# Patient Record
Sex: FEMALE | Race: WHITE | HISPANIC OR LATINO | Employment: FULL TIME | URBAN - METROPOLITAN AREA
[De-identification: names, ages, dates, MRNs, and addresses within clinical notes are randomized per-mention and may not be internally consistent; named-entity substitution may affect disease eponyms.]

---

## 2019-03-29 ENCOUNTER — TRANSCRIBE ORDERS (OUTPATIENT)
Dept: URGENT CARE | Facility: MEDICAL CENTER | Age: 49
End: 2019-03-29

## 2019-03-29 ENCOUNTER — APPOINTMENT (OUTPATIENT)
Dept: RADIOLOGY | Facility: MEDICAL CENTER | Age: 49
End: 2019-03-29
Attending: PHYSICIAN ASSISTANT

## 2019-03-29 ENCOUNTER — APPOINTMENT (OUTPATIENT)
Dept: URGENT CARE | Facility: MEDICAL CENTER | Age: 49
End: 2019-03-29

## 2019-03-29 DIAGNOSIS — Z92.89 HISTORY OF POSITIVE PPD: Primary | ICD-10-CM

## 2019-03-29 DIAGNOSIS — Z92.89 HISTORY OF POSITIVE PPD: ICD-10-CM

## 2019-03-29 PROCEDURE — 71045 X-RAY EXAM CHEST 1 VIEW: CPT

## 2020-01-06 ENCOUNTER — APPOINTMENT (OUTPATIENT)
Dept: URGENT CARE | Age: 50
End: 2020-01-06

## 2020-01-06 ENCOUNTER — APPOINTMENT (OUTPATIENT)
Dept: LAB | Age: 50
End: 2020-01-06

## 2020-01-06 DIAGNOSIS — Z02.1 PRE-EMPLOYMENT EXAMINATION: ICD-10-CM

## 2020-01-06 DIAGNOSIS — Z02.1 PRE-EMPLOYMENT EXAMINATION: Primary | ICD-10-CM

## 2020-01-06 LAB
HBV SURFACE AB SER-ACNC: <3.1 MIU/ML
RUBV IGG SERPL IA-ACNC: 31.5 IU/ML

## 2020-01-06 PROCEDURE — 86765 RUBEOLA ANTIBODY: CPT

## 2020-01-06 PROCEDURE — 86706 HEP B SURFACE ANTIBODY: CPT

## 2020-01-06 PROCEDURE — 86480 TB TEST CELL IMMUN MEASURE: CPT

## 2020-01-06 PROCEDURE — 36415 COLL VENOUS BLD VENIPUNCTURE: CPT

## 2020-01-06 PROCEDURE — 86787 VARICELLA-ZOSTER ANTIBODY: CPT

## 2020-01-06 PROCEDURE — 86762 RUBELLA ANTIBODY: CPT

## 2020-01-06 PROCEDURE — 86735 MUMPS ANTIBODY: CPT

## 2020-01-07 LAB
MEV IGG SER QL: NORMAL
MUV IGG SER QL: NORMAL
VZV IGG SER IA-ACNC: NORMAL

## 2020-01-08 LAB
GAMMA INTERFERON BACKGROUND BLD IA-ACNC: 0.35 IU/ML
M TB IFN-G BLD-IMP: NEGATIVE
M TB IFN-G CD4+ BCKGRND COR BLD-ACNC: -0.13 IU/ML
M TB IFN-G CD4+ BCKGRND COR BLD-ACNC: 0.01 IU/ML
MITOGEN IGNF BCKGRD COR BLD-ACNC: >10 IU/ML

## 2020-03-12 ENCOUNTER — OFFICE VISIT (OUTPATIENT)
Dept: FAMILY MEDICINE CLINIC | Facility: CLINIC | Age: 50
End: 2020-03-12
Payer: COMMERCIAL

## 2020-03-12 VITALS
TEMPERATURE: 98.3 F | SYSTOLIC BLOOD PRESSURE: 110 MMHG | OXYGEN SATURATION: 98 % | BODY MASS INDEX: 29.44 KG/M2 | WEIGHT: 160 LBS | DIASTOLIC BLOOD PRESSURE: 70 MMHG | HEIGHT: 62 IN | HEART RATE: 67 BPM

## 2020-03-12 DIAGNOSIS — Z13.220 ENCOUNTER FOR LIPID SCREENING FOR CARDIOVASCULAR DISEASE: ICD-10-CM

## 2020-03-12 DIAGNOSIS — Z23 NEED FOR ZOSTER VACCINATION: ICD-10-CM

## 2020-03-12 DIAGNOSIS — Z12.39 SCREENING FOR BREAST CANCER: ICD-10-CM

## 2020-03-12 DIAGNOSIS — Z13.1 ENCOUNTER FOR SCREENING FOR DIABETES MELLITUS: ICD-10-CM

## 2020-03-12 DIAGNOSIS — Z12.11 SCREEN FOR COLON CANCER: ICD-10-CM

## 2020-03-12 DIAGNOSIS — Z13.0 SCREENING FOR IRON DEFICIENCY ANEMIA: ICD-10-CM

## 2020-03-12 DIAGNOSIS — Z13.6 ENCOUNTER FOR LIPID SCREENING FOR CARDIOVASCULAR DISEASE: ICD-10-CM

## 2020-03-12 DIAGNOSIS — Z13.29 SCREENING FOR THYROID DISORDER: ICD-10-CM

## 2020-03-12 DIAGNOSIS — Z00.01 ENCOUNTER FOR WELL ADULT EXAM WITH ABNORMAL FINDINGS: Primary | ICD-10-CM

## 2020-03-12 PROCEDURE — 99386 PREV VISIT NEW AGE 40-64: CPT | Performed by: FAMILY MEDICINE

## 2020-03-12 PROCEDURE — 90471 IMMUNIZATION ADMIN: CPT

## 2020-03-12 PROCEDURE — 90750 HZV VACC RECOMBINANT IM: CPT

## 2020-03-12 NOTE — PROGRESS NOTES
FAMILY PRACTICE HEALTH MAINTENANCE OFFICE VISIT  [facility]  [facility] [place]    NAME: Janis Hercules  AGE: 44 y o  SEX: female  : 1970     DATE: 3/12/2020    Assessment and Plan     1  Encounter for well adult exam with abnormal findings  Assessment & Plan:  Advice and education were given regarding nutrition, aerobic exercises, weight bearing exercises, cardiovascular risk reduction, fall risk reduction, and age appropriate supplements  The patient was counseled regarding instructions for management, risk factor reductions, prognosis, risks and benefits of treatment options, patient and family education, and importance of compliance with treatment  Discussed the shingle vaccine with the patient she agree will give her 1st dose today possible side effect discussed with the patient      2  BMI 29 0-29 9,adult  Assessment & Plan:  The BMI is above average  BMI counseling and education was provided to the patient  Nutrition recommendations include reducing portion sizes, decreasing overall calorie intake, 3-5 servings of fruits/vegetables daily, reducing fast food intake, consuming healthier snacks, decreasing soda and/or juice intake, moderation in carbohydrate intake and reducing intake of saturated fat and trans fat  Exercise recommendations include moderate aerobic physical activity for 150 minutes/week, exercising 3-5 times per week and joining a gym  3  Screening for breast cancer  -     Mammo screening bilateral w 3d & cad; Future    4  Screen for colon cancer  -     Cologuard; Future    5  Need for zoster vaccination  -     Zoster Vaccine Recombinant IM    6  Encounter for lipid screening for cardiovascular disease  -     CBC and differential; Future  -     Basic metabolic panel; Future  -     Lipid Panel with Direct LDL reflex; Future  -     TSH, 3rd generation with Free T4 reflex; Future    7   Encounter for screening for diabetes mellitus  -     CBC and differential; Future  -     Basic metabolic panel; Future  -     Lipid Panel with Direct LDL reflex; Future  -     TSH, 3rd generation with Free T4 reflex; Future    8  Screening for iron deficiency anemia  -     CBC and differential; Future  -     Basic metabolic panel; Future  -     Lipid Panel with Direct LDL reflex; Future  -     TSH, 3rd generation with Free T4 reflex; Future    9  Screening for thyroid disorder  -     CBC and differential; Future  -     Basic metabolic panel; Future  -     Lipid Panel with Direct LDL reflex; Future  -     TSH, 3rd generation with Free T4 reflex; Future      · Patient Counseling:   · Nutrition: Stressed importance of a well balanced diet, moderation of sodium/saturated fat, caloric balance and sufficient intake of fiber  · Exercise: Stressed the importance of regular exercise with a goal of 150 minutes per week  · Dental Health: Discussed daily flossing and brushing and regular dental visits     · Immunizations reviewed: Risks and Benefits discussed  · Discussed benefits of:  Colon Cancer Screening, Mammogram , Cervical Cancer screening and Screening labs   BMI Counseling: Body mass index is 29 74 kg/m²  Discussed with patient's BMI with her          Chief Complaint     Chief Complaint   Patient presents with    Physical Exam       History of Present Illness     Patient new in my office here to establish care patient deny any chest pain short of breath no palpitation no cough no wheezing no hematosis no headache no blurred vision no weakness or lateralized of the symptom no abdomen pain nausea vomiting or diarrhea no renal problem no rash no fever no change in the weight and no change in the mood she deny smoking she does do exercise x3/w in no special diet a she did not have any screen for colonoscopy previously and she did not do mammogram yet      Well Adult Physical   Patient here for a comprehensive physical exam       Diet and Physical Activity  Diet: Regular  Exercise: frequently      Depression Screen  PHQ-9 Depression Screening    PHQ-9:    Frequency of the following problems over the past two weeks:       Little interest or pleasure in doing things:  0 - not at all  Feeling down, depressed, or hopeless:  0 - not at all  PHQ-2 Score:  0          General Health  Hearing: Normal:  bilateral  Vision: wears glasses  Dental: regular dental visits    Reproductive Health  Post-menopausal       The following portions of the patient's history were reviewed and updated as appropriate: allergies, current medications, past family history, past medical history, past social history, past surgical history and problem list     Review of Systems     Review of Systems   Constitutional: Negative for fatigue and fever  HENT: Negative for ear pain, sinus pressure, sinus pain and sore throat  Eyes: Negative for pain and redness  Respiratory: Negative for cough, chest tightness and shortness of breath  Cardiovascular: Negative for chest pain, palpitations and leg swelling  Gastrointestinal: Negative for abdominal pain, blood in stool, constipation, diarrhea and nausea  Endocrine: Negative for heat intolerance and polydipsia  Genitourinary: Negative for flank pain, frequency and hematuria  Musculoskeletal: Negative for back pain and joint swelling  Skin: Negative for rash  Neurological: Negative for dizziness, numbness and headaches  Hematological: Does not bruise/bleed easily  Psychiatric/Behavioral: Negative for agitation and behavioral problems         Past Medical History     Past Medical History:   Diagnosis Date    Anxiety states 2012       Past Surgical History     Past Surgical History:   Procedure Laterality Date     SECTION         Social History     Social History     Socioeconomic History    Marital status: Single     Spouse name: None    Number of children: None    Years of education: None    Highest education level: None   Occupational History    None   Social Needs    Financial resource strain: Not hard at all   [address] insecurity:     Worry: Never true     Inability: Never true   [employer] needs:     Medical: No     Non-medical: No   Tobacco Use    Smoking status: Never Smoker    Smokeless tobacco: Never Used   Substance and Sexual Activity    Alcohol use: Yes    Drug use: Never    Sexual activity: None   Lifestyle    Physical activity:     Days per week: 4 days     Minutes per session: 120 min    Stress: Only a little   Relationships    Social connections:     Talks on phone: More than three times a week     Gets together: More than three times a week     Attends Caodaism service: Never     Active member of club or organization: No     Attends meetings of clubs or organizations: Never     Relationship status: Never     Intimate partner violence:     Fear of current or ex partner: No     Emotionally abused: No     Physically abused: No     Forced sexual activity: No   Other Topics Concern    None   Social History Narrative    None       Family History     Family History   Problem Relation Age of Onset    Osteoporosis Mother     Leukemia Brother     Diabetes Brother        Current Medications     No current outpatient medications on file  Allergies     No Known Allergies    Objective     /70   Pulse 67   Temp 98 3 °F (36 8 °C) (Tympanic)   Ht 5' 1 5" (1 562 m)   Wt 72 6 kg (160 lb)   SpO2 98%   Breastfeeding No   BMI 29 74 kg/m²      Physical Exam   Constitutional: She is oriented to person, place, and time  She appears well-developed and well-nourished  HENT:   Head: Normocephalic  Right Ear: External ear normal    Left Ear: External ear normal    Eyes: Conjunctivae and EOM are normal  Right eye exhibits no discharge  Left eye exhibits no discharge  Neck: No JVD present  Cardiovascular: Normal rate, regular rhythm and normal heart sounds  Exam reveals no gallop  No murmur heard  Pulmonary/Chest: Effort normal  No respiratory distress  She has no wheezes  She has no rales  She exhibits no tenderness  Abdominal: She exhibits no mass  There is no tenderness  There is no rebound  Musculoskeletal: She exhibits no edema or tenderness  Neurological: She is alert and oriented to person, place, and time  Skin: No rash noted  No erythema  Psychiatric: She has a normal mood and affect  Her behavior is normal            Lety Keene MD  [address] [place] [address]  BMI Counseling: Body mass index is 29 74 kg/m²  The BMI is above normal  Nutrition recommendations include reducing portion sizes, decreasing overall calorie intake, 3-5 servings of fruits/vegetables daily and reducing fast food intake  Exercise recommendations include moderate aerobic physical activity for 150 minutes/week

## 2020-03-12 NOTE — PATIENT INSTRUCTIONS

## 2020-03-12 NOTE — ASSESSMENT & PLAN NOTE
Advice and education were given regarding nutrition, aerobic exercises, weight bearing exercises, cardiovascular risk reduction, fall risk reduction, and age appropriate supplements  The patient was counseled regarding instructions for management, risk factor reductions, prognosis, risks and benefits of treatment options, patient and family education, and importance of compliance with treatment       Discussed the shingle vaccine with the patient she agree will give her 1st dose today possible side effect discussed with the patient

## 2020-03-16 ENCOUNTER — TRANSCRIBE ORDERS (OUTPATIENT)
Dept: ADMINISTRATIVE | Facility: HOSPITAL | Age: 50
End: 2020-03-16

## 2020-03-16 ENCOUNTER — APPOINTMENT (OUTPATIENT)
Dept: LAB | Facility: HOSPITAL | Age: 50
End: 2020-03-16
Payer: COMMERCIAL

## 2020-03-16 ENCOUNTER — APPOINTMENT (OUTPATIENT)
Dept: LAB | Facility: HOSPITAL | Age: 50
End: 2020-03-16

## 2020-03-16 DIAGNOSIS — Z13.6 ENCOUNTER FOR LIPID SCREENING FOR CARDIOVASCULAR DISEASE: ICD-10-CM

## 2020-03-16 DIAGNOSIS — Z13.220 ENCOUNTER FOR LIPID SCREENING FOR CARDIOVASCULAR DISEASE: ICD-10-CM

## 2020-03-16 DIAGNOSIS — Z13.1 ENCOUNTER FOR SCREENING FOR DIABETES MELLITUS: ICD-10-CM

## 2020-03-16 DIAGNOSIS — Z00.8 HEALTH EXAMINATION IN POPULATION SURVEY: ICD-10-CM

## 2020-03-16 DIAGNOSIS — Z13.0 SCREENING FOR IRON DEFICIENCY ANEMIA: ICD-10-CM

## 2020-03-16 DIAGNOSIS — Z13.29 SCREENING FOR THYROID DISORDER: ICD-10-CM

## 2020-03-16 DIAGNOSIS — Z00.8 HEALTH EXAMINATION IN POPULATION SURVEY: Primary | ICD-10-CM

## 2020-03-16 LAB
ANION GAP SERPL CALCULATED.3IONS-SCNC: 7 MMOL/L (ref 4–13)
BASOPHILS # BLD AUTO: 0.04 THOUSANDS/ΜL (ref 0–0.1)
BASOPHILS NFR BLD AUTO: 1 % (ref 0–1)
BUN SERPL-MCNC: 24 MG/DL (ref 5–25)
CALCIUM SERPL-MCNC: 9.4 MG/DL (ref 8.3–10.1)
CHLORIDE SERPL-SCNC: 102 MMOL/L (ref 100–108)
CHOLEST SERPL-MCNC: 155 MG/DL (ref 50–200)
CO2 SERPL-SCNC: 29 MMOL/L (ref 21–32)
CREAT SERPL-MCNC: 0.98 MG/DL (ref 0.6–1.3)
EOSINOPHIL # BLD AUTO: 0.1 THOUSAND/ΜL (ref 0–0.61)
EOSINOPHIL NFR BLD AUTO: 1 % (ref 0–6)
ERYTHROCYTE [DISTWIDTH] IN BLOOD BY AUTOMATED COUNT: 13.1 % (ref 11.6–15.1)
EST. AVERAGE GLUCOSE BLD GHB EST-MCNC: 114 MG/DL
GFR SERPL CREATININE-BSD FRML MDRD: 67 ML/MIN/1.73SQ M
GLUCOSE P FAST SERPL-MCNC: 98 MG/DL (ref 65–99)
HBA1C MFR BLD: 5.6 %
HCT VFR BLD AUTO: 38.3 % (ref 34.8–46.1)
HDLC SERPL-MCNC: 59 MG/DL
HGB BLD-MCNC: 12.2 G/DL (ref 11.5–15.4)
IMM GRANULOCYTES # BLD AUTO: 0.02 THOUSAND/UL (ref 0–0.2)
IMM GRANULOCYTES NFR BLD AUTO: 0 % (ref 0–2)
LDLC SERPL CALC-MCNC: 88 MG/DL (ref 0–100)
LYMPHOCYTES # BLD AUTO: 2.02 THOUSANDS/ΜL (ref 0.6–4.47)
LYMPHOCYTES NFR BLD AUTO: 26 % (ref 14–44)
MCH RBC QN AUTO: 29 PG (ref 26.8–34.3)
MCHC RBC AUTO-ENTMCNC: 31.9 G/DL (ref 31.4–37.4)
MCV RBC AUTO: 91 FL (ref 82–98)
MONOCYTES # BLD AUTO: 0.67 THOUSAND/ΜL (ref 0.17–1.22)
MONOCYTES NFR BLD AUTO: 9 % (ref 4–12)
NEUTROPHILS # BLD AUTO: 4.95 THOUSANDS/ΜL (ref 1.85–7.62)
NEUTS SEG NFR BLD AUTO: 63 % (ref 43–75)
NRBC BLD AUTO-RTO: 0 /100 WBCS
PLATELET # BLD AUTO: 204 THOUSANDS/UL (ref 149–390)
PMV BLD AUTO: 11.3 FL (ref 8.9–12.7)
POTASSIUM SERPL-SCNC: 3.8 MMOL/L (ref 3.5–5.3)
RBC # BLD AUTO: 4.21 MILLION/UL (ref 3.81–5.12)
SODIUM SERPL-SCNC: 138 MMOL/L (ref 136–145)
TRIGL SERPL-MCNC: 41 MG/DL
TSH SERPL DL<=0.05 MIU/L-ACNC: 2.01 UIU/ML (ref 0.36–3.74)
WBC # BLD AUTO: 7.8 THOUSAND/UL (ref 4.31–10.16)

## 2020-03-16 PROCEDURE — 36415 COLL VENOUS BLD VENIPUNCTURE: CPT

## 2020-03-16 PROCEDURE — 84443 ASSAY THYROID STIM HORMONE: CPT

## 2020-03-16 PROCEDURE — 80061 LIPID PANEL: CPT

## 2020-03-16 PROCEDURE — 83036 HEMOGLOBIN GLYCOSYLATED A1C: CPT

## 2020-03-16 PROCEDURE — 85025 COMPLETE CBC W/AUTO DIFF WBC: CPT

## 2020-03-16 PROCEDURE — 80048 BASIC METABOLIC PNL TOTAL CA: CPT

## 2020-04-08 ENCOUNTER — TRANSCRIBE ORDERS (OUTPATIENT)
Dept: ADMINISTRATIVE | Facility: HOSPITAL | Age: 50
End: 2020-04-08

## 2020-04-08 ENCOUNTER — APPOINTMENT (OUTPATIENT)
Dept: LAB | Facility: HOSPITAL | Age: 50
End: 2020-04-08

## 2020-04-08 DIAGNOSIS — Z01.84 IMMUNITY STATUS TESTING: Primary | ICD-10-CM

## 2020-04-08 DIAGNOSIS — Z01.84 IMMUNITY STATUS TESTING: ICD-10-CM

## 2020-04-08 LAB — HBV SURFACE AB SER-ACNC: 67.86 MIU/ML

## 2020-04-08 PROCEDURE — 36415 COLL VENOUS BLD VENIPUNCTURE: CPT

## 2020-04-08 PROCEDURE — 86706 HEP B SURFACE ANTIBODY: CPT

## 2020-05-28 ENCOUNTER — TELEMEDICINE (OUTPATIENT)
Dept: FAMILY MEDICINE CLINIC | Facility: CLINIC | Age: 50
End: 2020-05-28
Payer: COMMERCIAL

## 2020-05-28 VITALS — BODY MASS INDEX: 29.74 KG/M2 | HEART RATE: 80 BPM | WEIGHT: 160 LBS

## 2020-05-28 DIAGNOSIS — R10.32 LLQ PAIN: Primary | ICD-10-CM

## 2020-05-28 PROCEDURE — 1036F TOBACCO NON-USER: CPT | Performed by: FAMILY MEDICINE

## 2020-05-28 PROCEDURE — 99214 OFFICE O/P EST MOD 30 MIN: CPT | Performed by: FAMILY MEDICINE

## 2020-06-02 ENCOUNTER — TELEPHONE (OUTPATIENT)
Dept: ADMINISTRATIVE | Facility: OTHER | Age: 50
End: 2020-06-02

## 2020-06-04 ENCOUNTER — OFFICE VISIT (OUTPATIENT)
Dept: FAMILY MEDICINE CLINIC | Facility: CLINIC | Age: 50
End: 2020-06-04
Payer: COMMERCIAL

## 2020-06-04 VITALS
OXYGEN SATURATION: 97 % | TEMPERATURE: 98.9 F | WEIGHT: 160 LBS | HEIGHT: 62 IN | DIASTOLIC BLOOD PRESSURE: 70 MMHG | HEART RATE: 71 BPM | BODY MASS INDEX: 29.44 KG/M2 | SYSTOLIC BLOOD PRESSURE: 100 MMHG

## 2020-06-04 DIAGNOSIS — R21 RASH: ICD-10-CM

## 2020-06-04 DIAGNOSIS — G89.29 CHRONIC LEFT-SIDED LOW BACK PAIN WITHOUT SCIATICA: ICD-10-CM

## 2020-06-04 DIAGNOSIS — M54.50 CHRONIC LEFT-SIDED LOW BACK PAIN WITHOUT SCIATICA: ICD-10-CM

## 2020-06-04 DIAGNOSIS — G44.89 OTHER HEADACHE SYNDROME: Primary | ICD-10-CM

## 2020-06-04 PROCEDURE — 3008F BODY MASS INDEX DOCD: CPT | Performed by: FAMILY MEDICINE

## 2020-06-04 PROCEDURE — 1036F TOBACCO NON-USER: CPT | Performed by: FAMILY MEDICINE

## 2020-06-04 PROCEDURE — 99215 OFFICE O/P EST HI 40 MIN: CPT | Performed by: FAMILY MEDICINE

## 2020-06-05 PROBLEM — G89.29 CHRONIC LEFT-SIDED LOW BACK PAIN WITHOUT SCIATICA: Status: ACTIVE | Noted: 2020-06-05

## 2020-06-05 PROBLEM — M54.50 CHRONIC LEFT-SIDED LOW BACK PAIN WITHOUT SCIATICA: Status: ACTIVE | Noted: 2020-06-05

## 2020-06-05 PROBLEM — G89.29 CHRONIC LEFT-SIDED LOW BACK PAIN WITHOUT SCIATICA: Status: ACTIVE | Noted: 2020-06-04

## 2020-06-05 PROBLEM — R21 RASH: Status: ACTIVE | Noted: 2020-06-05

## 2020-06-05 PROBLEM — R21 RASH: Status: ACTIVE | Noted: 2020-06-04

## 2020-06-05 PROBLEM — G44.89 OTHER HEADACHE SYNDROME: Status: ACTIVE | Noted: 2020-06-05

## 2020-06-05 PROBLEM — M54.50 CHRONIC LEFT-SIDED LOW BACK PAIN WITHOUT SCIATICA: Status: ACTIVE | Noted: 2020-06-04

## 2020-07-09 ENCOUNTER — TELEPHONE (OUTPATIENT)
Dept: FAMILY MEDICINE CLINIC | Facility: CLINIC | Age: 50
End: 2020-07-09

## 2020-12-01 ENCOUNTER — TELEMEDICINE (OUTPATIENT)
Dept: FAMILY MEDICINE CLINIC | Facility: CLINIC | Age: 50
End: 2020-12-01
Payer: COMMERCIAL

## 2020-12-01 DIAGNOSIS — R51.9 ACUTE NONINTRACTABLE HEADACHE, UNSPECIFIED HEADACHE TYPE: ICD-10-CM

## 2020-12-01 DIAGNOSIS — R52 GENERALIZED BODY ACHES: ICD-10-CM

## 2020-12-01 DIAGNOSIS — R05.9 COUGH: ICD-10-CM

## 2020-12-01 DIAGNOSIS — R52 GENERALIZED BODY ACHES: Primary | ICD-10-CM

## 2020-12-01 PROBLEM — Z20.822 EXPOSURE TO COVID-19 VIRUS: Status: ACTIVE | Noted: 2020-12-01

## 2020-12-01 PROCEDURE — U0003 INFECTIOUS AGENT DETECTION BY NUCLEIC ACID (DNA OR RNA); SEVERE ACUTE RESPIRATORY SYNDROME CORONAVIRUS 2 (SARS-COV-2) (CORONAVIRUS DISEASE [COVID-19]), AMPLIFIED PROBE TECHNIQUE, MAKING USE OF HIGH THROUGHPUT TECHNOLOGIES AS DESCRIBED BY CMS-2020-01-R: HCPCS | Performed by: NURSE PRACTITIONER

## 2020-12-01 PROCEDURE — 99213 OFFICE O/P EST LOW 20 MIN: CPT | Performed by: NURSE PRACTITIONER

## 2020-12-02 ENCOUNTER — TELEPHONE (OUTPATIENT)
Dept: FAMILY MEDICINE CLINIC | Facility: CLINIC | Age: 50
End: 2020-12-02

## 2020-12-02 LAB — SARS-COV-2 RNA SPEC QL NAA+PROBE: NOT DETECTED

## 2021-01-05 ENCOUNTER — IMMUNIZATIONS (OUTPATIENT)
Dept: FAMILY MEDICINE CLINIC | Facility: HOSPITAL | Age: 51
End: 2021-01-05

## 2021-01-05 DIAGNOSIS — Z23 ENCOUNTER FOR IMMUNIZATION: ICD-10-CM

## 2021-01-05 PROCEDURE — 91301 SARS-COV-2 / COVID-19 MRNA VACCINE (MODERNA) 100 MCG: CPT

## 2021-01-05 PROCEDURE — 0011A SARS-COV-2 / COVID-19 MRNA VACCINE (MODERNA) 100 MCG: CPT

## 2021-01-21 ENCOUNTER — TELEPHONE (OUTPATIENT)
Dept: FAMILY MEDICINE CLINIC | Facility: CLINIC | Age: 51
End: 2021-01-21

## 2021-02-15 ENCOUNTER — IMMUNIZATIONS (OUTPATIENT)
Dept: FAMILY MEDICINE CLINIC | Facility: HOSPITAL | Age: 51
End: 2021-02-15

## 2021-02-15 DIAGNOSIS — Z23 ENCOUNTER FOR IMMUNIZATION: Primary | ICD-10-CM

## 2021-02-15 PROCEDURE — 91301 SARS-COV-2 / COVID-19 MRNA VACCINE (MODERNA) 100 MCG: CPT

## 2021-02-15 PROCEDURE — 0012A SARS-COV-2 / COVID-19 MRNA VACCINE (MODERNA) 100 MCG: CPT

## 2021-03-08 ENCOUNTER — OFFICE VISIT (OUTPATIENT)
Dept: FAMILY MEDICINE CLINIC | Facility: CLINIC | Age: 51
End: 2021-03-08

## 2021-03-08 VITALS
SYSTOLIC BLOOD PRESSURE: 124 MMHG | HEIGHT: 62 IN | TEMPERATURE: 98.3 F | RESPIRATION RATE: 20 BRPM | DIASTOLIC BLOOD PRESSURE: 68 MMHG | BODY MASS INDEX: 28.16 KG/M2 | OXYGEN SATURATION: 98 % | HEART RATE: 70 BPM | WEIGHT: 153 LBS

## 2021-03-08 DIAGNOSIS — R51.9 ACUTE NONINTRACTABLE HEADACHE, UNSPECIFIED HEADACHE TYPE: ICD-10-CM

## 2021-03-08 DIAGNOSIS — H52.10 MYOPIA, UNSPECIFIED LATERALITY: ICD-10-CM

## 2021-03-08 DIAGNOSIS — H52.00 HYPERMETROPIA, UNSPECIFIED LATERALITY: ICD-10-CM

## 2021-03-08 DIAGNOSIS — Z00.00 ANNUAL PHYSICAL EXAM: Primary | ICD-10-CM

## 2021-03-08 DIAGNOSIS — G89.29 CHRONIC LEFT-SIDED LOW BACK PAIN WITHOUT SCIATICA: ICD-10-CM

## 2021-03-08 DIAGNOSIS — Z12.31 BREAST CANCER SCREENING BY MAMMOGRAM: ICD-10-CM

## 2021-03-08 DIAGNOSIS — Z00.00 HEALTHCARE MAINTENANCE: ICD-10-CM

## 2021-03-08 DIAGNOSIS — K59.00 CONSTIPATION, UNSPECIFIED CONSTIPATION TYPE: ICD-10-CM

## 2021-03-08 DIAGNOSIS — Z12.4 CERVICAL CANCER SCREENING: ICD-10-CM

## 2021-03-08 DIAGNOSIS — M54.50 CHRONIC LEFT-SIDED LOW BACK PAIN WITHOUT SCIATICA: ICD-10-CM

## 2021-03-08 DIAGNOSIS — F41.9 ANXIETY: ICD-10-CM

## 2021-03-08 PROCEDURE — 99386 PREV VISIT NEW AGE 40-64: CPT | Performed by: PHYSICIAN ASSISTANT

## 2021-03-08 RX ORDER — IBUPROFEN 800 MG/1
TABLET ORAL
COMMUNITY
Start: 2020-12-18

## 2021-03-08 NOTE — PROGRESS NOTES
Assessment/Plan:     Annual physical   - Patient is due for mammogram and Pap  Patient is agreeable for order for mammogram and Pap today  Constipation  - Discussed pathophysiology of constipation with patient  - Increase fluid intake, primarily water  Increase daily dietary fiber (dark leafy green vegetables, apples with skin, whole grain breads)  - May use OTC fiber source to supplement diet (Metamucil, Citrucel, FiberCon, Benefiber)  - Increase daily activity which can help stimulate bowel movements  - Avoid OTC laxatives and colon cleanses preparations which can worsen constipation  Chronic left-sided low back pain without sciatica  - Stable  Continue taking Tylenol or Motrin as needed for pain  - Advised to apply heating pad/ ice as needed to the affected area  Acute nonintractable headache  - Stable  Continue taking Tylenol as needed  - Advised patient to complete updated eye exam     Anxiety  - Patient has been dealing with anxiety for a long time but has always dealt with it on her own  - Patient is now requesting to see a therapist   Will place referral to behavior health therapy to further assist patient in scheduling outpatient mental health services  Return in about 1 year (around 3/8/2022) for Annual physical       Diagnoses and all orders for this visit:    Annual physical exam    Healthcare maintenance  -     CBC and differential; Future  -     Comprehensive metabolic panel; Future  -     Lipid panel; Future  -     TSH, 3rd generation with Free T4 reflex; Future  -     Vitamin D 25 hydroxy; Future  -     Ferritin; Future  -     Iron Saturation %; Future    Breast cancer screening by mammogram  -     Mammo screening bilateral w cad; Future    Cervical cancer screening  -     Ambulatory referral to Obstetrics / Gynecology; Future    Myopia, unspecified laterality  -     Ambulatory referral to Optometry;  Future    Hypermetropia, unspecified laterality  - Ambulatory referral to Optometry; Future    Anxiety  -     Ambulatory referral to behavioral health therapists; Future    Acute nonintractable headache, unspecified headache type    Chronic left-sided low back pain without sciatica    Constipation, unspecified constipation type    Other orders  -     ibuprofen (MOTRIN) 800 mg tablet        All of patients questions were answered  Patient understands and agrees with the above plan  Mayelin Long PA-C  03/08/21  [facility] [place]       Subjective:     Allyson Mast is a 46 y o  female who  has a past medical history of Anxiety states  She also has no past medical history of Allergic, Anemia, Arthritis, Asthma, Cancer ([address] ), Chronic kidney disease, Clotting disorder ([address] ), COPD (chronic obstructive pulmonary disease) ([address] ), Coronary artery disease, Dementia ([address] ), Depression, Diabetes mellitus ([address] ), Disease of thyroid gland, Diverticulitis of colon, Eating disorder, GERD (gastroesophageal reflux disease), Heart murmur, HL (hearing loss), Hypertension, Infectious viral hepatitis, Inflammatory bowel disease, Kidney stone, Memory loss, Myocardial infarction ([address] ), Obesity, Osteoporosis, Otitis media, Pneumonia, Scoliosis, Seizures ([address] ), Shingles, Stroke ([address] ), Substance abuse ([address] ), Urinary tract infection, or Visual impairment  who presented to the office today to establish care  - Patient is a 46 y o  female who presents today to   Establish care  Patient notes she is currently taking daily vitamin-D and vitamin C supplements  Patient denies any known allergies  Patient notes for a long time now she has been experiencing headaches  Patient notes they occur about 2 times a month and are usually located of her left temporal area  Patient admits to associated nausea, but denies any vomiting  Patient notes she will take either ibuprofen, Tylenol, or Excedrin every 6 hours and eventually the headache will go away    Patient notes previously she was taking a medication for her thyroid, but then she had repeat blood work completed and the medication was discontinued  Patient notes recently she has been feeling weaker and more fatigued than usual     - Patient notes she will also experience some pain of her left lower back area  Patient notes it comes and goes  Patient notes she will take Tylenol or Motrin as needed for the pain  She denies any fevers, numbness or tingling down the legs, saddle anesthesia, or loss of bladder or bowel function       - Patient notes for about 1 year now she has been experiencing constipation  Patient notes previously she would drink coffee and that would help her to use the bathroom  However, patient notes she will now drink coffee but that does not help  Patient notes she did purchase some laxatives over-the-counter but they do not agree with her stomach  Patient notes she does eat a lot of bread  Patient notes she does not eat a lot of vegetables  - Patient notes recently her mood has been all over the place  Patient notes she could be happy 1 minutes and then very frustrated in [place] the neck is  Patient notes she has went through a lot through life and does experience anxiety  Patient notes she has always been dealing with anxiety on her own, but now she would like to see a therapist   Patient notes there are times where she experiences palpitations and sweating and she has to try to calm down  Patient notes it has been happening more often  Patient denies any suicidal or homicidal ideations  Dental Regular Visits: Yes    Vision Problems: Wears glasses for both near and far vision  Patient notes she is due for a new pair of glasses  Last Vision Examination: About 2 years ago, due for updated eye exam        Life Style  Healthy Diet: Overall, patient notes she eats relatively well  Regular Exercise: Walks regularly but has not been able to go as often recently due to weather     Weight Concerns:  Patient notes she usually weighs around 140 lb and is currently weighing 153 lb  She would like to lose about 10 lb  Tobacco Use: No   Alcohol Use: Drinks wine about once a month  Drug Use: No    Reproductive Health  Contraception: Tubal ligation  LMP: About 5 years ago, postmenopausal  OB History: ; 2 C-sections      Breast Cancer Screening:  - Risks and benefits discussed  Patient notes she never had mammogram completed  Colorectal Cancer Screening:   - Risks and benefits discussed  Patient completed cologuard in 2020  Results were negative  Cervical Cancer Screening:  - Risks and benefits discussed  Last PAP in 2016  Results were normal        Current Outpatient Medications on File Prior to Visit   Medication Sig Dispense Refill    ibuprofen (MOTRIN) 800 mg tablet        No current facility-administered medications on file prior to visit  Past Medical History:   Diagnosis Date    Anxiety states 2012     Past Surgical History:   Procedure Laterality Date     SECTION      x2    TUBAL LIGATION       Social History     Socioeconomic History    Marital status: Single     Spouse name: None    Number of children: None    Years of education: None    Highest education level: None   Occupational History    None   Social Needs    Financial resource strain: Not hard at all   [address] insecurity     Worry: Never true     Inability: Never true    Transportation needs     Medical: No     Non-medical: No   Tobacco Use    Smoking status: Never Smoker    Smokeless tobacco: Never Used   Substance and Sexual Activity    Alcohol use: Yes     Comment: Once a month    Drug use: Never    Sexual activity: None   Lifestyle    Physical activity     Days per week: 4 days     Minutes per session: 30 min    Stress:  Only a little   Relationships    Social connections     Talks on phone: More than three times a week     Gets together: More than three times a week     Attends Mandaen service: Never     Active member of club or organization: No     Attends meetings of clubs or organizations: Never     Relationship status: Never     Intimate partner violence     Fear of current or ex partner: No     Emotionally abused: No     Physically abused: No     Forced sexual activity: No   Other Topics Concern    None   Social History Narrative    None     Family History   Problem Relation Age of Onset    Osteoporosis Mother     Leukemia Brother     Diabetes Brother          Review of Systems   Constitutional: Positive for fatigue  Negative for chills and fever  HENT: Negative for congestion and sore throat  Eyes: Negative for pain  Respiratory: Negative for cough and shortness of breath  Cardiovascular: Positive for palpitations (occasionally)  Negative for chest pain  Gastrointestinal: Positive for constipation  Negative for abdominal pain, diarrhea, nausea and vomiting  Genitourinary: Negative for difficulty urinating and dysuria  Musculoskeletal: Positive for back pain  Skin: Negative for rash  Neurological: Positive for headaches (2x/month)  Negative for dizziness  Psychiatric/Behavioral: Negative for self-injury and suicidal ideas  The patient is nervous/anxious  Objective:  /68 (BP Location: Right arm, Patient Position: Sitting, Cuff Size: Standard)   Pulse 70   Temp 98 3 °F (36 8 °C) (Temporal)   Resp 20   Ht 5' 1 5" (1 562 m)   Wt 69 4 kg (153 lb)   SpO2 98%   BMI 28 44 kg/m²     Physical Exam  Vitals signs and nursing note reviewed  Constitutional:       Appearance: She is well-developed  HENT:      Head: Normocephalic and atraumatic  Right Ear: Tympanic membrane and external ear normal       Left Ear: Tympanic membrane and external ear normal       Nose: Nose normal       Mouth/Throat:      Pharynx: Uvula midline  Eyes:      Extraocular Movements: Extraocular movements intact        Conjunctiva/sclera: Conjunctivae normal  Pupils: Pupils are equal, round, and reactive to light  Neck:      Musculoskeletal: Normal range of motion and neck supple  Cardiovascular:      Rate and Rhythm: Normal rate and regular rhythm  Heart sounds: Normal heart sounds  No murmur  Pulmonary:      Effort: Pulmonary effort is normal       Breath sounds: Normal breath sounds  No wheezing  Abdominal:      General: Bowel sounds are normal       Palpations: Abdomen is soft  Tenderness: There is no abdominal tenderness  Musculoskeletal: Normal range of motion  General: No swelling  Skin:     General: Skin is warm and dry  Neurological:      Mental Status: She is alert and oriented to person, place, and time     Psychiatric:         Mood and Affect: Mood normal          Speech: Speech normal          Behavior: Behavior normal

## 2021-03-08 NOTE — ASSESSMENT & PLAN NOTE
- Discussed pathophysiology of constipation with patient  - Increase fluid intake, primarily water  Increase daily dietary fiber (dark leafy green vegetables, apples with skin, whole grain breads)  - May use OTC fiber source to supplement diet (Metamucil, Citrucel, FiberCon, Benefiber)  - Increase daily activity which can help stimulate bowel movements  - Avoid OTC laxatives and colon cleanses preparations which can worsen constipation

## 2021-03-08 NOTE — PATIENT INSTRUCTIONS
High Fiber Diet   WHAT YOU NEED TO KNOW:   A high-fiber diet includes foods that have a high amount of fiber  Fiber is the part of fruits, vegetables, and grains that is not broken down by your body  Fiber keeps your bowel movements regular  Fiber can also help lower your cholesterol level, control blood sugar in people with diabetes, and relieve constipation  Fiber can also help you control your weight because it helps you feel full faster  Most adults should eat 25 to 35 grams of fiber each day  Talk to your dietitian or healthcare provider about the amount of fiber you need  DISCHARGE INSTRUCTIONS:   Good sources of fiber:       · Foods with at least 4 grams of fiber per serving:      ? ? to ½ cup of high-fiber cereal (check the nutrition label on the box)    ? ½ cup of blackberries or raspberries    ? 4 dried prunes    ? 1 cooked artichoke    ? ½ cup of cooked legumes, such as lentils, or red, kidney, and marr beans    · Foods with 1 to 3 grams of fiber per serving:      ? 1 slice of whole-wheat, pumpernickel, or rye bread    ? ½ cup of cooked brown rice    ? 4 whole-wheat crackers    ? 1 cup of oatmeal    ? ½ cup of cereal with 1 to 3 grams of fiber per serving (check the nutrition label on the box)    ? 1 small piece of fruit, such as an apple, banana, pear, kiwi, or orange    ? 3 dates    ? ½ cup of canned apricots, fruit cocktail, peaches, or pears    ? ½ cup of raw or cooked vegetables, such as carrots, cauliflower, cabbage, spinach, squash, or corn  Ways that you can increase fiber in your diet:   · Choose brown or wild rice instead of white rice  · Use whole wheat flour in recipes instead of white or all-purpose flour  · Add beans and peas to casseroles or soups  · Choose fresh fruit and vegetables with peels or skins on instead of juices  Other diet guidelines to follow:   · Add fiber to your diet slowly    You may have abdominal discomfort, bloating, and gas if you add fiber to your diet too quickly  · Drink plenty of liquids as you add fiber to your diet  You may have nausea or develop constipation if you do not drink enough water  Ask how much liquid to drink each day and which liquids are best for you  © Copyright [address] Information is for End User's use only and may not be sold, redistributed or otherwise used for commercial purposes  All illustrations and images included in CareNotes® are the copyrighted property of A D A M , Inc  or [facility] Catherine Rios   The above information is an  only  It is not intended as medical advice for individual conditions or treatments  Talk to your doctor, nurse or pharmacist before following any medical regimen to see if it is safe and effective for you  Constipation   WHAT YOU NEED TO KNOW:   Constipation is when you have hard, dry bowel movements, or you go longer than usual between bowel movements  DISCHARGE INSTRUCTIONS:   Call your doctor if:   · You have blood in your bowel movements  · You have a fever and abdominal pain with the constipation  · Your constipation gets worse  · You start to vomit  · You have questions or concerns about your condition or care  Medicines:   · Medicine  such as a laxative may help relax and loosen your intestines to help you have a bowel movement  Your provider may recommend you only use laxatives for a short time  Long-term use may make your bowels dependent on the medicine  · Take your medicine as directed  Contact your healthcare provider if you think your medicine is not helping or if you have side effects  Tell him of her if you are allergic to any medicine  Keep a list of the medicines, vitamins, and herbs you take  Include the amounts, and when and why you take them  Bring the list or the pill bottles to follow-up visits  Carry your medicine list with you in case of an emergency      Relieve constipation:   · A suppository  may be used to help soften your bowel movements  This may make them easier to pass  A suppository is guided into your rectum through your anus  · An enema  is liquid medicine used to clear bowel movement from your rectum  The medicine is put into your rectum through your anus  Prevent constipation:   · Drink liquids as directed  You may need to drink extra liquids to help soften and move your bowels  Ask how much liquid to drink each day and which liquids are best for you  · Eat high-fiber foods  This may help decrease constipation by adding bulk to your bowel movements  High-fiber foods include fruit, vegetables, whole-grain breads and cereals, and beans  Your healthcare provider or dietitian can help you create a high-fiber meal plan  Your provider may also recommend a fiber supplement if you cannot get enough fiber from food  · Exercise regularly  Regular physical activity can help stimulate your intestines  Walking is a good exercise to prevent or relieve constipation  Ask which exercises are best for you  · Schedule a time each day to have a bowel movement  This may help train your body to have regular bowel movements  Bend forward while you are on the toilet to help move the bowel movement out  Sit on the toilet for at least 10 minutes, even if you do not have a bowel movement  · Talk to your healthcare provider about your medicines  Certain medicines, such as opioids, can cause constipation  Your provider may be able to make medicine changes  For example, he or she may change the kind of medicine, or change when you take it  Follow up with your healthcare provider as directed:  Write down your questions so you remember to ask them during your visits  © Copyright [address] Information is for End User's use only and may not be sold, redistributed or otherwise used for commercial purposes   All illustrations and images included in CareNotes® are the copyrighted property of [employer]  or [address] [place] [address] Gabriel   The above information is an  only  It is not intended as medical advice for individual conditions or treatments  Talk to your doctor, nurse or pharmacist before following any medical regimen to see if it is safe and effective for you

## 2021-03-08 NOTE — ASSESSMENT & PLAN NOTE
- Stable  Continue taking Tylenol or Motrin as needed for pain  - Advised to apply heating pad/ ice as needed to the affected area

## 2021-03-08 NOTE — ASSESSMENT & PLAN NOTE
- Patient has been dealing with anxiety for a long time but has always dealt with it on her own  - Patient is now requesting to see a therapist   Will place referral to behavior health therapy to further assist patient in scheduling outpatient mental health services

## 2021-03-09 ENCOUNTER — TELEPHONE (OUTPATIENT)
Dept: FAMILY MEDICINE CLINIC | Facility: CLINIC | Age: 51
End: 2021-03-09

## 2021-03-09 NOTE — TELEPHONE ENCOUNTER
Celeste      Please schedule with Dr Smith Plan on Thursday morning during [facility] block      Thank you

## 2021-04-28 ENCOUNTER — OFFICE VISIT (OUTPATIENT)
Dept: OBGYN CLINIC | Facility: CLINIC | Age: 51
End: 2021-04-28
Payer: COMMERCIAL

## 2021-04-28 VITALS
BODY MASS INDEX: 28.61 KG/M2 | HEIGHT: 62 IN | SYSTOLIC BLOOD PRESSURE: 114 MMHG | WEIGHT: 155.5 LBS | DIASTOLIC BLOOD PRESSURE: 78 MMHG

## 2021-04-28 DIAGNOSIS — Z01.419 WOMEN'S ANNUAL ROUTINE GYNECOLOGICAL EXAMINATION: Primary | ICD-10-CM

## 2021-04-28 DIAGNOSIS — N83.202 CYST OF LEFT OVARY: ICD-10-CM

## 2021-04-28 PROCEDURE — 99386 PREV VISIT NEW AGE 40-64: CPT | Performed by: STUDENT IN AN ORGANIZED HEALTH CARE EDUCATION/TRAINING PROGRAM

## 2021-04-28 PROCEDURE — 87624 HPV HI-RISK TYP POOLED RSLT: CPT | Performed by: STUDENT IN AN ORGANIZED HEALTH CARE EDUCATION/TRAINING PROGRAM

## 2021-04-28 PROCEDURE — G0145 SCR C/V CYTO,THINLAYER,RESCR: HCPCS | Performed by: STUDENT IN AN ORGANIZED HEALTH CARE EDUCATION/TRAINING PROGRAM

## 2021-04-28 NOTE — PROGRESS NOTES
New patient annual exam - Gynecology    Chief complaint: annual exam    Chief Complaint   Patient presents with    Gynecologic Exam     annual exam    last pap: 16-neg  last mammo: never  last colonoscopy: cologuard 20- neg  Assessment/Plan     46 y o  [id number] with normal annual gynecologic examination  Problem List Items Addressed This Visit        Endocrine    Cyst of left ovary     History of, no intervention  On exam today, left adnexal fullness/tenderness  Ultrasound ordered         Relevant Orders    US pelvis complete w transvaginal      Other Visit Diagnoses     Women's annual routine gynecological examination    -  Primary    Relevant Orders    Liquid-based pap, screening        Normal findings on routine gynecologic exam today  Reviewed annual screening mammogram and annual clinical breast exam       Discussed ASCCP guidelines and Pap smear with cotesting frequency, collected today  STD/STI testing offered, declined today - reports low risk  Encouraged daily calcium and vitamin D intake was well as weight bearing exercise daily for bone health  Follow up PRN or for annual exam   --------------------------------------------------------  History of Present Illness     Sapphire Sanchez is a 46 y o  female  No LMP recorded (lmp unknown)  Patient is postmenopausal  who presents for annual examination  Concerns today: some moodiness recently but no hot flashes or night sweats    New updates: none    Health maintenance  Last pap smear: 2016 NILM  Bone density scan: n/a  Last mammogram: Not on file has not yet had  Last colonoscopy: Not on file Cologard 2020 normal  HPV vaccination?: no    GYN History  Menarche age 13  No LMP recorded (lmp unknown)  Patient is postmenopausal    5 years ago last menstrual    Previously regular periods  No history abnormal Pap smears  No history of cryotherapy, LEEP, or cold knife cone of the cervix    Sexually active?  No, lack of partner  Current sexual partner(s): hypothetically men  History of STD: no  Interested in STD screening today? no  Concerns about sex: no    Occupation: [employer] works on [place] [address]    OB History    Para Term  AB Living   2 2 2     2   SAB TAB Ectopic Multiple Live Births           2      # Outcome Date GA Lbr Jamal/2nd Weight Sex Delivery Anes PTL Lv   2 Term     F CS-LTranv   RAGINI   1 Term     F CS-LTranv   RAGINI     # 1 - Date: None, Sex: Female, Weight: None, GA: None, Delivery: , Low Transverse, Apgar1: None, Apgar5: None, Living: Living, Birth Comments: None    # 2 - Date: None, Sex: Female, Weight: None, GA: None, Delivery: , Low Transverse, Apgar1: None, Apgar5: None, Living: Living, Birth Comments: None    Past Medical History:   Diagnosis Date    Anxiety states 2012     Past Surgical History:   Procedure Laterality Date     SECTION      x2    TUBAL LIGATION       Family History   Problem Relation Age of Onset    Osteoporosis Mother     Leukemia Brother     Diabetes Brother      Social History   Social History     Substance and Sexual Activity   Alcohol Use Not Currently    Comment: Once a month     Social History     Substance and Sexual Activity   Drug Use Never     Social History     Tobacco Use   Smoking Status Never Smoker   Smokeless Tobacco Never Used       Current Outpatient Medications:     Ascorbic Acid (VITAMIN C PO), Take by mouth, Disp: , Rfl:     VITAMIN D PO, Take by mouth, Disp: , Rfl:     ibuprofen (MOTRIN) 800 mg tablet, , Disp: , Rfl:   No Known Allergies    REVIEW OF SYSTEMS  CONSTITUTIONAL:  No weight loss, fever, chills, weakness  HEENT: No visual loss, blurred vision  SKIN: No rash or itching  CARDIOVASCULAR: No chest pain, chest pressure, or chest discomfort  RESPIRATORY: No shortness of breath, cough or sputum  GASTROINTESTINAL: No nausea, emesis, or diarrhea    NEUROLOGICAL: No headache, dizziness, syncope, paralysis  MUSCUOSKELETAL: No muscle, back pain, joint stiffness or bruising  INFECTIOUS: No fever, chills  PSYCHIATRIC: No disorder of thought or mood  HEMATOLOGIC: No easy bruising or bleeding  GYN: no postmenopausal bleeding  No involuntary urine loss  No pain with intercourse  No vaginal dryness    Objective   Vitals: Blood pressure 114/78, height 5' 2" (1 575 m), weight 70 5 kg (155 lb 8 oz), not currently breastfeeding  Body mass index is 28 44 kg/m²  General: NAD, AAOx3  Heart: RRR  Lungs: CTAB  Neck: supple, no thyromegaly or thyroid nodules appreciated  Breast: nipples everted bilaterally, no skin changes  No dimpling, redness, or erythema  No breast masses or axillary masses bilaterally  Fibrocystic breast changes  Abdomen: soft, non-distended, non tender to palpation  Well healed  section scars  Extremities: non-tender to palpation  Speculum exam: Normal appearing external genitalia, normal hair distribution  Urethra well-suspended, no clitoromegaly noted  Vagina pink moist  Minimal rugation, scant discharge noted  Cervix without lesion, parous appearing  no blood in vaginal vault    Pelvic exam: no cervical motion tenderness  No right adnexal masses or tenderness  Left adnexal fullness, tenderness to palpation  retroverted uterus, normal size  Lab Results:   No visits with results within 1 Day(s) from this visit     Latest known visit with results is:   Orders Only on 2020   Component Date Value    SARS-CoV-2  2020 Not Detected
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
## 2021-04-29 ENCOUNTER — OFFICE VISIT (OUTPATIENT)
Dept: FAMILY MEDICINE CLINIC | Facility: CLINIC | Age: 51
End: 2021-04-29

## 2021-04-29 DIAGNOSIS — F41.1 ANXIETY STATES: Primary | ICD-10-CM

## 2021-04-29 PROCEDURE — 99213 OFFICE O/P EST LOW 20 MIN: CPT | Performed by: FAMILY MEDICINE

## 2021-04-30 LAB
HPV HR 12 DNA CVX QL NAA+PROBE: NEGATIVE
HPV16 DNA CVX QL NAA+PROBE: NEGATIVE
HPV18 DNA CVX QL NAA+PROBE: NEGATIVE

## 2021-05-04 LAB
LAB AP GYN PRIMARY INTERPRETATION: NORMAL
Lab: NORMAL

## 2021-05-18 ENCOUNTER — PATIENT OUTREACH (OUTPATIENT)
Dept: FAMILY MEDICINE CLINIC | Facility: CLINIC | Age: 51
End: 2021-05-18

## 2021-05-27 ENCOUNTER — PATIENT OUTREACH (OUTPATIENT)
Dept: FAMILY MEDICINE CLINIC | Facility: CLINIC | Age: 51
End: 2021-05-27

## 2021-05-27 NOTE — PROGRESS NOTES
SWCM team has attempted to reach pt three times in regard to assistance with [facility] resources for anxiety  Messages have been left with call back information provided  Due to non response, the referral will be closed on the workqueue  SWCM will remain available

## 2021-08-02 ENCOUNTER — OFFICE VISIT (OUTPATIENT)
Dept: FAMILY MEDICINE CLINIC | Facility: CLINIC | Age: 51
End: 2021-08-02

## 2021-08-02 DIAGNOSIS — Z20.822 SUSPECTED COVID-19 VIRUS INFECTION: Primary | ICD-10-CM

## 2021-08-02 PROCEDURE — U0005 INFEC AGEN DETEC AMPLI PROBE: HCPCS | Performed by: FAMILY MEDICINE

## 2021-08-02 PROCEDURE — U0003 INFECTIOUS AGENT DETECTION BY NUCLEIC ACID (DNA OR RNA); SEVERE ACUTE RESPIRATORY SYNDROME CORONAVIRUS 2 (SARS-COV-2) (CORONAVIRUS DISEASE [COVID-19]), AMPLIFIED PROBE TECHNIQUE, MAKING USE OF HIGH THROUGHPUT TECHNOLOGIES AS DESCRIBED BY CMS-2020-01-R: HCPCS | Performed by: FAMILY MEDICINE

## 2021-08-02 PROCEDURE — 99442 PR PHYS/QHP TELEPHONE EVALUATION 11-20 MIN: CPT | Performed by: INTERNAL MEDICINE

## 2021-08-02 RX ORDER — CETIRIZINE HYDROCHLORIDE 10 MG/1
10 TABLET ORAL DAILY
Qty: 30 TABLET | Refills: 0 | Status: SHIPPED | OUTPATIENT
Start: 2021-08-02

## 2021-08-02 RX ORDER — FLUTICASONE PROPIONATE 50 MCG
1 SPRAY, SUSPENSION (ML) NASAL DAILY
Qty: 11.1 ML | Refills: 0 | Status: SHIPPED | OUTPATIENT
Start: 2021-08-02

## 2021-08-02 NOTE — LETTER
August 2, 2021     Patient: Alex Notice   YOB: 1970   Date of Visit: 8/2/2021       To Whom it May Concern:    Andrzej Feliciano is under my professional care  She was evaluated via televisit in my office on 8/2/2021  She is currently undergoing testing for COVID-19  Should remain in quarantine until having results  If you have any questions or concerns, please don't hesitate to call           Sincerely,          [facility]        CC:   No Recipients

## 2021-08-02 NOTE — PROGRESS NOTES
COVID-19 Outpatient Progress Note    Assessment/Plan:    Problem List Items Addressed This Visit        Other    Suspected COVID-19 virus infection - Primary     -discussed with patient about symptoms suggestive of viral illness  -given above presentation of symptoms, will also send patient for COVID test to rule out infection  -will send prescription for: mucinex, flonase, zyrtec for symptomatic relief  -discussed stay at home and quarantine precautions  -will notify when test results come back   -ED precautions given   -needs letter for work, will create and will be available in patient's chart         Relevant Medications    dextromethorphan-guaifenesin (MUCINEX DM)  MG per 12 hr tablet    cetirizine (ZyrTEC) 10 mg tablet    fluticasone (FLONASE) 50 mcg/act nasal spray    Other Relevant Orders    Novel Coronavirus (COVID-19), [facility] or [facility] (Completed)         Disposition:     I referred patient to one of our centralized sites for a COVID-19 swab  I have spent 15 minutes directly with the patient  Greater than 50% of this time was spent in counseling/coordination of care regarding: instructions for management and impressions  Verification of patient location: [place], [place]    Patient is located in the [facility] state in which I hold an active license [place]     It was my intent to perform this visit via video technology (ScreenTag) but the patient was not able to do a video connection so the visit was completed via audio telephone only        Encounter provider Dena Brown MD    Provider located at [place] [id number] [phone]    Recent Visits  Date Type Provider Dept   08/02/21 Office Visit Dena Brown MD  [facility]   Showing recent visits within past 7 days and meeting all other requirements  Future Appointments  No visits were found meeting these conditions  Showing future appointments within next 150 days and meeting all other requirements         Subjective:   Katherine Saul is a 46 y o  female who is concerned about COVID-19  Patient's symptoms include chills, nasal congestion, rhinorrhea, sore throat and headache      -symptoms of: Nasal congestion with onset around Friday- runny nose, sneezing, also developed cough  -Saturday- went to work-- at night-- had headache  -has been taking tylenol cold, tylenol PRN  -Yesterday- felt worse- muscle aches, sore throat, Worsening headache and sleeping more  -yesterday- chills and sometimes felt warm  -Works at [employer] on [employer] floor; she has been Wearing all times full PPE  Last testing in 2020- negative  -During the day- feels slight better, at night feels worse  -Sore voice as well  -completed COVID immunization as per chart review    Lab Results   Component Value Date    SARSCOV2 Negative 2021    SARSCOV2 Not Detected 2020     Past Medical History:   Diagnosis Date    Anxiety states 2012     Past Surgical History:   Procedure Laterality Date     SECTION      x2    TUBAL LIGATION       Current Outpatient Medications   Medication Sig Dispense Refill    Ascorbic Acid (VITAMIN C PO) Take by mouth      cetirizine (ZyrTEC) 10 mg tablet Take 1 tablet (10 mg total) by mouth daily 30 tablet 0    dextromethorphan-guaifenesin (MUCINEX DM)  MG per 12 hr tablet Take 1 tablet by mouth every 12 (twelve) hours as needed for cough 30 tablet 0    fluticasone (FLONASE) 50 mcg/act nasal spray 1 spray into each nostril daily 11 1 mL 0    ibuprofen (MOTRIN) 800 mg tablet       VITAMIN D PO Take by mouth       No current facility-administered medications for this visit  No Known Allergies    Review of Systems   Constitutional: Positive for chills  HENT: Positive for congestion, rhinorrhea, sneezing and sore throat      Musculoskeletal:        Muscle aches   Neurological: Positive for headaches  Objective: There were no vitals filed for this visit  VIRTUAL VISIT [address] verbally agrees to participate in [employer]  Pt is aware that [employer] could be limited without vital signs or the ability to perform a full hands-on physical [place] understands she or the provider may request at any time to terminate the video visit and request the patient to seek care or treatment in person

## 2021-08-02 NOTE — LETTER
August 2, 2021     Patient: Helen Prader   YOB: 1970   Date of Visit: 8/2/2021       To Whom it May Concern:    Loki Islas is under my professional care  She was evaluated via televisit in my office on 8/2/2021  Due to current medical condition, patient is not cleared to return to work until further follow up re-evaluation  If you have any questions or concerns, please don't hesitate to call           Sincerely,          Rodney Iqbal MD        CC: No Recipients

## 2021-08-02 NOTE — ASSESSMENT & PLAN NOTE
-discussed with patient about symptoms suggestive of viral illness  -given above presentation of symptoms, will also send patient for COVID test to rule out infection  -will send prescription for: mucinex, flonase, zyrtec for symptomatic relief  -discussed stay at home and quarantine precautions  -will notify when test results come back   -ED precautions given   -needs letter for work, will create and will be available in patient's chart

## 2021-08-04 ENCOUNTER — TELEPHONE (OUTPATIENT)
Dept: FAMILY MEDICINE CLINIC | Facility: CLINIC | Age: 51
End: 2021-08-04

## 2021-08-04 NOTE — TELEPHONE ENCOUNTER
----- Message from Modesto Jacob MD sent at 8/3/2021  3:27 PM EDT -----  Regarding: follow up televisit  Hi,    This patient will require follow up televisit for clearance to return to work after COVID testing  Can you please assist in scheduling? Thanks!

## 2021-08-11 NOTE — TELEPHONE ENCOUNTER
Called pt to schedule covid Clearance f/u but pt stated that she is already at work  Pt took her Negative results to work, and has been working since Saturday 08/07/21    No further action is needed

## 2021-11-12 ENCOUNTER — OFFICE VISIT (OUTPATIENT)
Dept: FAMILY MEDICINE CLINIC | Facility: CLINIC | Age: 51
End: 2021-11-12

## 2021-11-12 ENCOUNTER — APPOINTMENT (OUTPATIENT)
Dept: LAB | Facility: CLINIC | Age: 51
End: 2021-11-12
Payer: COMMERCIAL

## 2021-11-12 VITALS
HEART RATE: 65 BPM | RESPIRATION RATE: 19 BRPM | TEMPERATURE: 97.2 F | WEIGHT: 158 LBS | DIASTOLIC BLOOD PRESSURE: 60 MMHG | BODY MASS INDEX: 28.9 KG/M2 | SYSTOLIC BLOOD PRESSURE: 100 MMHG | OXYGEN SATURATION: 98 %

## 2021-11-12 DIAGNOSIS — E66.3 OVERWEIGHT: ICD-10-CM

## 2021-11-12 DIAGNOSIS — Z00.00 HEALTHCARE MAINTENANCE: ICD-10-CM

## 2021-11-12 DIAGNOSIS — Z13.31 DEPRESSION SCREEN: ICD-10-CM

## 2021-11-12 DIAGNOSIS — G44.89 OTHER HEADACHE SYNDROME: Primary | ICD-10-CM

## 2021-11-12 LAB
25(OH)D3 SERPL-MCNC: 22.4 NG/ML (ref 30–100)
ALBUMIN SERPL BCP-MCNC: 3.7 G/DL (ref 3.5–5)
ALP SERPL-CCNC: 57 U/L (ref 46–116)
ALT SERPL W P-5'-P-CCNC: 38 U/L (ref 12–78)
ANION GAP SERPL CALCULATED.3IONS-SCNC: 6 MMOL/L (ref 4–13)
AST SERPL W P-5'-P-CCNC: 24 U/L (ref 5–45)
BASOPHILS # BLD AUTO: 0.03 THOUSANDS/ΜL (ref 0–0.1)
BASOPHILS NFR BLD AUTO: 1 % (ref 0–1)
BILIRUB SERPL-MCNC: 0.64 MG/DL (ref 0.2–1)
BUN SERPL-MCNC: 18 MG/DL (ref 5–25)
CALCIUM SERPL-MCNC: 9.7 MG/DL (ref 8.3–10.1)
CHLORIDE SERPL-SCNC: 107 MMOL/L (ref 100–108)
CHOLEST SERPL-MCNC: 182 MG/DL (ref 50–200)
CO2 SERPL-SCNC: 30 MMOL/L (ref 21–32)
CREAT SERPL-MCNC: 0.86 MG/DL (ref 0.6–1.3)
EOSINOPHIL # BLD AUTO: 0.1 THOUSAND/ΜL (ref 0–0.61)
EOSINOPHIL NFR BLD AUTO: 2 % (ref 0–6)
ERYTHROCYTE [DISTWIDTH] IN BLOOD BY AUTOMATED COUNT: 13.2 % (ref 11.6–15.1)
FERRITIN SERPL-MCNC: 201 NG/ML (ref 8–388)
GFR SERPL CREATININE-BSD FRML MDRD: 77 ML/MIN/1.73SQ M
GLUCOSE P FAST SERPL-MCNC: 90 MG/DL (ref 65–99)
HCT VFR BLD AUTO: 39.7 % (ref 34.8–46.1)
HDLC SERPL-MCNC: 55 MG/DL
HGB BLD-MCNC: 13 G/DL (ref 11.5–15.4)
IMM GRANULOCYTES # BLD AUTO: 0.01 THOUSAND/UL (ref 0–0.2)
IMM GRANULOCYTES NFR BLD AUTO: 0 % (ref 0–2)
IRON SATN MFR SERPL: 53 % (ref 15–50)
IRON SERPL-MCNC: 156 UG/DL (ref 50–170)
LDLC SERPL CALC-MCNC: 114 MG/DL (ref 0–100)
LYMPHOCYTES # BLD AUTO: 2.19 THOUSANDS/ΜL (ref 0.6–4.47)
LYMPHOCYTES NFR BLD AUTO: 52 % (ref 14–44)
MCH RBC QN AUTO: 29.1 PG (ref 26.8–34.3)
MCHC RBC AUTO-ENTMCNC: 32.7 G/DL (ref 31.4–37.4)
MCV RBC AUTO: 89 FL (ref 82–98)
MONOCYTES # BLD AUTO: 0.37 THOUSAND/ΜL (ref 0.17–1.22)
MONOCYTES NFR BLD AUTO: 9 % (ref 4–12)
NEUTROPHILS # BLD AUTO: 1.54 THOUSANDS/ΜL (ref 1.85–7.62)
NEUTS SEG NFR BLD AUTO: 36 % (ref 43–75)
NONHDLC SERPL-MCNC: 127 MG/DL
NRBC BLD AUTO-RTO: 0 /100 WBCS
PLATELET # BLD AUTO: 214 THOUSANDS/UL (ref 149–390)
PMV BLD AUTO: 11.9 FL (ref 8.9–12.7)
POTASSIUM SERPL-SCNC: 4.1 MMOL/L (ref 3.5–5.3)
PROT SERPL-MCNC: 7.6 G/DL (ref 6.4–8.2)
RBC # BLD AUTO: 4.47 MILLION/UL (ref 3.81–5.12)
SODIUM SERPL-SCNC: 143 MMOL/L (ref 136–145)
TIBC SERPL-MCNC: 294 UG/DL (ref 250–450)
TRIGL SERPL-MCNC: 66 MG/DL
TSH SERPL DL<=0.05 MIU/L-ACNC: 2.14 UIU/ML (ref 0.36–3.74)
WBC # BLD AUTO: 4.24 THOUSAND/UL (ref 4.31–10.16)

## 2021-11-12 PROCEDURE — 80053 COMPREHEN METABOLIC PANEL: CPT

## 2021-11-12 PROCEDURE — 80061 LIPID PANEL: CPT

## 2021-11-12 PROCEDURE — 82306 VITAMIN D 25 HYDROXY: CPT

## 2021-11-12 PROCEDURE — 82728 ASSAY OF FERRITIN: CPT

## 2021-11-12 PROCEDURE — 84443 ASSAY THYROID STIM HORMONE: CPT

## 2021-11-12 PROCEDURE — 99213 OFFICE O/P EST LOW 20 MIN: CPT | Performed by: PHYSICIAN ASSISTANT

## 2021-11-12 PROCEDURE — 83550 IRON BINDING TEST: CPT

## 2021-11-12 PROCEDURE — 36415 COLL VENOUS BLD VENIPUNCTURE: CPT

## 2021-11-12 PROCEDURE — 85025 COMPLETE CBC W/AUTO DIFF WBC: CPT

## 2021-11-12 PROCEDURE — 83540 ASSAY OF IRON: CPT

## 2021-11-12 RX ORDER — TOPIRAMATE 25 MG/1
TABLET ORAL
Qty: 49 TABLET | Refills: 1 | Status: SHIPPED | OUTPATIENT
Start: 2021-11-12 | End: 2022-03-11 | Stop reason: SINTOL

## 2022-03-11 ENCOUNTER — OFFICE VISIT (OUTPATIENT)
Dept: FAMILY MEDICINE CLINIC | Facility: CLINIC | Age: 52
End: 2022-03-11

## 2022-03-11 VITALS
TEMPERATURE: 97.7 F | RESPIRATION RATE: 18 BRPM | WEIGHT: 165 LBS | SYSTOLIC BLOOD PRESSURE: 114 MMHG | HEART RATE: 73 BPM | HEIGHT: 62 IN | DIASTOLIC BLOOD PRESSURE: 70 MMHG | BODY MASS INDEX: 30.36 KG/M2 | OXYGEN SATURATION: 99 %

## 2022-03-11 DIAGNOSIS — F41.1 ANXIETY STATES: Primary | ICD-10-CM

## 2022-03-11 DIAGNOSIS — F51.02 ADJUSTMENT INSOMNIA: ICD-10-CM

## 2022-03-11 DIAGNOSIS — G44.89 OTHER HEADACHE SYNDROME: ICD-10-CM

## 2022-03-11 PROCEDURE — 99214 OFFICE O/P EST MOD 30 MIN: CPT | Performed by: PHYSICIAN ASSISTANT

## 2022-03-11 RX ORDER — HYDROXYZINE HYDROCHLORIDE 25 MG/1
25 TABLET, FILM COATED ORAL EVERY 6 HOURS PRN
Qty: 60 TABLET | Refills: 1 | Status: SHIPPED | OUTPATIENT
Start: 2022-03-11

## 2022-03-11 NOTE — PATIENT INSTRUCTIONS
Anxiety   WHAT YOU NEED TO KNOW:   Anxiety is a condition that causes you to feel extremely worried or nervous  The feelings are so strong that they can cause problems with your daily activities or sleep  Anxiety may be triggered by something you fear, or it may happen without a cause  Family or work stress, smoking, caffeine, and alcohol can increase your risk for anxiety  Certain medicines or health conditions can also increase your risk  Anxiety can become a long-term condition if it is not managed or treated  DISCHARGE INSTRUCTIONS:   Call your local emergency number (911 in the [address] [facility] [address] [facility] [address]) if:   · You have chest pain, tightness, or heaviness that may spread to your shoulders, arms, jaw, neck, or back  · You feel like hurting yourself or someone else  Call your doctor if:   · Your symptoms get worse or do not get better with treatment  · Your anxiety keeps you from doing your regular daily activities  · You have new symptoms since your last visit  · You have questions or concerns about your condition or care  Medicines:   · Medicines  may be given to help you feel more calm and relaxed, and decrease your symptoms  · Take your medicine as directed  Contact your healthcare provider if you think your medicine is not helping or if you have side effects  Tell him of her if you are allergic to any medicine  Keep a list of the medicines, vitamins, and herbs you take  Include the amounts, and when and why you take them  Bring the list or the pill bottles to follow-up visits  Carry your medicine list with you in case of an emergency  Manage anxiety:   · Talk to someone about your anxiety  Your healthcare provider may suggest counseling  Cognitive behavioral therapy can help you understand and change how you react to events that trigger your symptoms  You might feel more comfortable talking with a friend or family member about your anxiety   Choose someone you know will be supportive and encouraging  · Find ways to relax  Activities such as exercise, meditation, or listening to music can help you relax  Spend time with friends, or do things you enjoy  · Practice deep breathing  Deep breathing can help you relax when you feel anxious  Focus on taking slow, deep breaths several times a day, or during an anxiety attack  Breathe in through your nose and out through your mouth  · Create a regular sleep routine  Regular sleep can help you feel calmer during the day  Go to sleep and wake up at the same times every day  Do not watch television or use the computer right before bed  Your room should be comfortable, dark, and quiet  · Eat a variety of healthy foods  Healthy foods include fruits, vegetables, low-fat dairy products, lean meats, fish, whole-grain breads, and cooked beans  Healthy foods can help you feel less anxious and have more energy  · Exercise regularly  Exercise can increase your energy level  Exercise may also lift your mood and help you sleep better  Your healthcare provider can help you create an exercise plan  · Do not smoke  Nicotine and other chemicals in cigarettes and cigars can increase anxiety  Ask your healthcare provider for information if you currently smoke and need help to quit  E-cigarettes or smokeless tobacco still contain nicotine  Talk to your healthcare provider before you use these products  · Do not have caffeine  Caffeine can make your symptoms worse  Do not have foods or drinks that are meant to increase your energy level  · Limit or do not drink alcohol  Ask your healthcare provider if alcohol is safe for you  You may not be able to drink alcohol if you take certain anxiety or depression medicines  Limit alcohol to 1 drink per day if you are a woman  Limit alcohol to 2 drinks per day if you are a man  A drink of alcohol is 12 ounces of beer, 5 ounces of wine, or 1½ ounces of liquor  · Do not use drugs    Drugs can make your anxiety worse  It can also make anxiety hard to manage  Talk to your healthcare provider if you use drugs and want help to quit  Follow up with your doctor within 2 weeks or as directed:  Write down your questions so you remember to ask them during your visits  © Copyright [employer] 2022 Information is for End User's use only and may not be sold, redistributed or otherwise used for commercial purposes  All illustrations and images included in CareNotes® are the copyrighted property of [employer]  or [facility] Catherine Rios   The above information is an  only  It is not intended as medical advice for individual conditions or treatments  Talk to your doctor, nurse or pharmacist before following any medical regimen to see if it is safe and effective for you  Insomnia   WHAT YOU NEED TO KNOW:   Insomnia is a condition that makes it hard to fall or stay asleep  Lack of sleep can lead to attention or memory problems during the day  You may also be mcdonald, depressed, clumsy, or have headaches  DISCHARGE INSTRUCTIONS:   Contact your healthcare provider if:   · Your symptoms do not get better, or they get worse  · You begin to use drugs or alcohol to fall asleep  · You have questions or concerns about your condition or care  Medicines:   · Medicines  may help you sleep more regularly or help you feel less anxious  · Take your medicine as directed  Contact your healthcare provider if you think your medicine is not helping or if you have side effects  Tell him or her if you are allergic to any medicine  Keep a list of the medicines, vitamins, and herbs you take  Include the amounts, and when and why you take them  Bring the list or the pill bottles to follow-up visits  Carry your medicine list with you in case of an emergency  What you can do to improve your sleep:   · Create a sleep schedule  Try to go to sleep and wake up at the same times every day   Keep a record of your sleep patterns, and any sleeping problems you have  Bring the record to follow-up visits with healthcare providers  · Do not take naps  Naps could make it hard for you to fall asleep at bedtime  · Keep your bedroom cool, quiet, and dark  Turn on white noise, such as a fan, to help you relax  Do not use your bed for any activity that will keep you awake  Do not read, exercise, eat, or watch TV in your bedroom  · Get up if you do not fall asleep within 20 minutes  Move to another room and do something relaxing until you become sleepy  · Limit caffeine, alcohol, and food to earlier in the day  Only drink caffeine in the morning  Do not drink alcohol within 6 hours of bedtime  Do not eat a heavy meal right before you go to bed  · Exercise regularly  Daily exercise may help you sleep better  Do not exercise within 4 hours of bedtime  Follow up with your healthcare provider as directed: Your healthcare provider may refer you for cognitive behavioral therapy  A behavioral therapist may help you find ways to relax, decrease stress, and improve sleep  Write down your questions so you remember to ask them during your visits  © Copyright [employer] 2022 Information is for End User's use only and may not be sold, redistributed or otherwise used for commercial purposes  All illustrations and images included in CareNotes® are the copyrighted property of [employer]  or Brittanie Mancini  The above information is an  only  It is not intended as medical advice for individual conditions or treatments  Talk to your doctor, nurse or pharmacist before following any medical regimen to see if it is safe and effective for you

## 2022-03-11 NOTE — PROGRESS NOTES
Assessment/Plan:  - Reviewed iron studies, ferritin, vitamin-D, TSH, lipid panel, CBC, CMP with patient from 11/12/2021  - I have recommended that this patient have a mammogram but she declines at this time  I have discussed the risks and benefits of this examination with her  The patient verbalizes understanding  Anxiety/insomnia  - Will trial hydroxyzine 25 mg, to be taken every 6 hours as needed for anxiety  Advised to try taking it at night to help with sleep as well  - Discussed proper sleep hygiene with patient  Other headache syndrome  - Patient had tried taking Topamax, but she experienced an episode where she almost passed out, so she has now stopped taking the medication  Patient does not wish to try a different preventative medication at this time  - Advised to continue taking Excedrin migraine or Tylenol as needed  - Advised to continue to work on improving sleep which could also improve migraines  Diagnoses and all orders for this visit:    Anxiety states  -     hydrOXYzine HCL (ATARAX) 25 mg tablet; Take 1 tablet (25 mg total) by mouth every 6 (six) hours as needed for anxiety    Adjustment insomnia  -     hydrOXYzine HCL (ATARAX) 25 mg tablet; Take 1 tablet (25 mg total) by mouth every 6 (six) hours as needed for anxiety    Other headache syndrome          All of patients questions were answered  Patient understands and agrees with the above plan  Return in about 2 months (around 5/11/2022) for Next scheduled follow up headaches, insomnia  Karina Myers PA-C  03/11/22  [address] [facility] [place]          Subjective:     Patient ID: Pasquale Nelson  is a 45 y o  female with known PHM of anxiety, migraines, varicose veins who presents today in office for migraine follow-up  - Patient is a 45 y o  female who presents today for migraine follow-up  Patient was prescribed Topamax at last visit    Patient notes for about 3 weeks she did not experience any migraines, but then 2 days before Thanksgiving she had an awful headache and almost passed out  Patient notes she was wearing a lot of equipment while at work during the time  Patient notes she then stop taking the medication  Patient notes she continues to experience a headache about once or twice per month  She is taking Excedrin migraine as needed with sometimes helps  Patient notes she generally has to stay home from work when she does experience a headache  Patient notes the Topamax also cause her to have more mood swings  Patient notes she currently has not been sleeping well  Patient notes she is in very tired throughout the day  Patient notes her body is physically tired, but when she tries to sleep, her mind keeps racing, so it makes it difficult to sleep  The following portions of the patient's history were reviewed and updated as appropriate: allergies, current medications, past family history, past medical history, past social history, past surgical history and problem list         Review of Systems   Constitutional: Negative for chills and fever  HENT: Negative for congestion and sore throat  Eyes: Positive for photophobia (with headaches)  Negative for pain  Respiratory: Negative for cough and shortness of breath  Cardiovascular: Negative for chest pain and palpitations  Gastrointestinal: Positive for nausea (with headaches)  Negative for abdominal pain, constipation, diarrhea and vomiting  Genitourinary: Negative for difficulty urinating and dysuria  Musculoskeletal: Negative for myalgias  Skin: Negative for rash  Neurological: Positive for headaches (1-2x/month)  Negative for dizziness, weakness, light-headedness and numbness  Psychiatric/Behavioral: Positive for sleep disturbance  Negative for dysphoric mood, self-injury and suicidal ideas  The patient is nervous/anxious                    Objective:   Vitals:    03/11/22 1259   BP: 114/70   BP Location: Left arm   Patient Position: Sitting   Cuff Size: Adult   Pulse: 73   Resp: 18   Temp: 97 7 °F (36 5 °C)   TempSrc: Temporal   SpO2: 99%   Weight: 74 8 kg (165 lb)   Height: 5' 2" (1 575 m)         Physical Exam  Vitals and nursing note reviewed  Constitutional:       General: She is not in acute distress  Appearance: She is well-developed  HENT:      Head: Normocephalic and atraumatic  Right Ear: External ear normal       Left Ear: External ear normal       Nose: Nose normal    Eyes:      Conjunctiva/sclera: Conjunctivae normal    Cardiovascular:      Rate and Rhythm: Normal rate and regular rhythm  Pulses: Normal pulses  Heart sounds: Normal heart sounds  Pulmonary:      Effort: Pulmonary effort is normal  No respiratory distress  Breath sounds: Normal breath sounds  No wheezing  Musculoskeletal:      Cervical back: Normal range of motion and neck supple  Skin:     General: Skin is warm and dry  Neurological:      Mental Status: She is alert and oriented to person, place, and time  Cranial Nerves: Cranial nerves are intact  No facial asymmetry  Sensory: Sensation is intact  Coordination: Coordination is intact     Psychiatric:         Behavior: Behavior normal

## 2022-03-15 NOTE — ASSESSMENT & PLAN NOTE
- Patient had tried taking Topamax, but she experienced an episode where she almost passed out, so she has now stopped taking the medication  Patient does not wish to try a different preventative medication at this time  - Advised to continue taking Excedrin migraine or Tylenol as needed  - Advised to continue to work on improving sleep which could also improve migraines

## 2022-10-12 PROBLEM — R05.9 COUGH: Status: RESOLVED | Noted: 2020-12-01 | Resolved: 2022-10-12

## 2023-04-13 PROBLEM — M25.551 RIGHT HIP PAIN: Status: ACTIVE | Noted: 2023-04-13

## 2023-05-22 ENCOUNTER — HOSPITAL ENCOUNTER (OUTPATIENT)
Dept: MRI IMAGING | Facility: HOSPITAL | Age: 53
Discharge: HOME | End: 2023-05-22

## 2023-05-22 DIAGNOSIS — R51.9 ACUTE NONINTRACTABLE HEADACHE, UNSPECIFIED HEADACHE TYPE: ICD-10-CM

## 2023-06-23 ENCOUNTER — OFFICE VISIT (OUTPATIENT)
Dept: FAMILY MEDICINE CLINIC | Facility: CLINIC | Age: 53
End: 2023-06-23

## 2023-06-23 VITALS
BODY MASS INDEX: 30.77 KG/M2 | HEIGHT: 62 IN | TEMPERATURE: 98.3 F | SYSTOLIC BLOOD PRESSURE: 128 MMHG | DIASTOLIC BLOOD PRESSURE: 76 MMHG | OXYGEN SATURATION: 99 % | HEART RATE: 88 BPM | WEIGHT: 167.2 LBS | RESPIRATION RATE: 18 BRPM

## 2023-06-23 DIAGNOSIS — R51.9 ACUTE NONINTRACTABLE HEADACHE, UNSPECIFIED HEADACHE TYPE: ICD-10-CM

## 2023-06-23 DIAGNOSIS — E53.8 VITAMIN B12 DEFICIENCY: ICD-10-CM

## 2023-06-23 DIAGNOSIS — Z00.00 ANNUAL PHYSICAL EXAM: Primary | ICD-10-CM

## 2023-06-23 DIAGNOSIS — E55.9 VITAMIN D DEFICIENCY: ICD-10-CM

## 2023-06-23 DIAGNOSIS — F41.1 ANXIETY STATES: ICD-10-CM

## 2023-06-23 PROCEDURE — 99214 OFFICE O/P EST MOD 30 MIN: CPT | Performed by: PHYSICIAN ASSISTANT

## 2023-06-23 PROCEDURE — 99396 PREV VISIT EST AGE 40-64: CPT | Performed by: PHYSICIAN ASSISTANT

## 2023-06-23 RX ORDER — HYDROXYZINE HYDROCHLORIDE 25 MG/1
25 TABLET, FILM COATED ORAL EVERY 6 HOURS PRN
Qty: 30 TABLET | Refills: 1 | Status: SHIPPED | OUTPATIENT
Start: 2023-06-23

## 2023-06-23 RX ORDER — NARATRIPTAN 2.5 MG/1
2.5 TABLET ORAL ONCE AS NEEDED
Qty: 10 TABLET | Refills: 2 | Status: SHIPPED | OUTPATIENT
Start: 2023-06-23

## 2023-06-23 RX ORDER — HYDROXYZINE HYDROCHLORIDE 10 MG/1
10 TABLET, FILM COATED ORAL EVERY 6 HOURS PRN
Qty: 30 TABLET | Refills: 1 | Status: SHIPPED | OUTPATIENT
Start: 2023-06-23 | End: 2023-06-23 | Stop reason: DRUGHIGH

## 2023-06-23 NOTE — ASSESSMENT & PLAN NOTE
- Headaches continue to be fairly frequent, occurring 1-2 days at a time every 2 weeks or so  -Reviewed MRI brain without contrast (5/22/2023)  Results show mild nonspecific cerebral white matter  No acute abnormality   - Patient had previously tried taking Topamax, but she experienced an episode where she almost passed out, so she then stopped taking the medication  Patient does not wish to try a different preventative medication at this time   -Patient had tried Imitrex, but did not find it effective  Patient has tried naratriptan 2 5 mg and does find it to be helpful and is requesting prescription today  - Will discontinue Imitrex  Will start naratriptan 2 5 mg once as needed at onset of migraine   - Advised to continue to work on improving sleep which could also improve migraines   -Per patient, she is up-to-date with eye exam   - Encouraged adequate nutrition, sleep, hydration

## 2023-06-23 NOTE — ASSESSMENT & PLAN NOTE
- Recent vitamin D level low  Recommend starting once weekly vitamin D 50,000 units, however patient prefers to continue with OTC vitamin D daily supplement

## 2023-06-23 NOTE — PROGRESS NOTES
[address]    NAME: Levy Khan  AGE: 52 y o  SEX: female  : 1970     DATE: 2023     Assessment and Plan:     Problem List Items Addressed This Visit        Other    Anxiety states (Chronic)     - Patient had tried taking hydroxyzine 10 mg, but did not find it effective  Will increase dose to 25 mg, every 6 hours as needed for anxiety  - Discussed coping mechanisms   - Recommended starting outpatient mental therapy, but patient declines at this time  Relevant Medications    hydrOXYzine HCL (ATARAX) 25 mg tablet    Acute nonintractable headache (Chronic)     - Headaches continue to be fairly frequent, occurring 1-2 days at a time every 2 weeks or so  -Reviewed MRI brain without contrast (2023)  Results show mild nonspecific cerebral white matter  No acute abnormality   - Patient had previously tried taking Topamax, but she experienced an episode where she almost passed out, so she then stopped taking the medication  Patient does not wish to try a different preventative medication at this time   -Patient had tried Imitrex, but did not find it effective  Patient has tried naratriptan 2 5 mg and does find it to be helpful and is requesting prescription today  - Will discontinue Imitrex  Will start naratriptan 2 5 mg once as needed at onset of migraine   - Advised to continue to work on improving sleep which could also improve migraines   -Per patient, she is up-to-date with eye exam   - Encouraged adequate nutrition, sleep, hydration  Relevant Medications    naratriptan (AMERGE) 2 5 MG tablet    Vitamin D deficiency (Chronic)     - Recent vitamin D level low  Recommend starting once weekly vitamin D 50,000 units, however patient prefers to continue with OTC vitamin D daily supplement           Vitamin B12 deficiency (Chronic)     - Recent vitamin B12 level low normal   Continue daily multivitamin  Other Visit Diagnoses     Annual physical exam    -  Primary          - I have recommended that this patient have a mammogram but she declines at this time  I have discussed the risks and benefits of this examination with her  The patient verbalizes understanding     -Patient will be due for updated Cologuard as of 7/2/2023  We will plan to place order at next visit  Immunizations and preventive care screenings were discussed with patient today  Appropriate education was printed on patient's after visit summary  Counseling:  Alcohol/drug use: discussed moderation in alcohol intake, the recommendations for healthy alcohol use, and avoidance of illicit drug use  Dental Health: discussed importance of regular tooth brushing, flossing, and dental visits  Injury prevention: discussed safety/seat belts, safety helmets, smoke detectors, carbon dioxide detectors, and smoking near bedding or upholstery  Sexual health: discussed sexually transmitted diseases, partner selection, use of condoms, avoidance of unintended pregnancy, and contraceptive alternatives  Exercise: the importance of regular exercise/physical activity was discussed  Recommend exercise 3-5 times per week for at least 30 minutes  Return in about 3 months (around 9/23/2023) for Next scheduled follow up headaches   Chief Complaint:     Chief Complaint   Patient presents with   • Migraine      History of Present Illness:     Adult Annual Physical   Patient here for a comprehensive physical exam   Patient notes she continues with experiencing headaches 1-2 times a week  Patient notes she tried Imitrex, but it did not help  Patient notes her friend has naratriptan 2 5 mg and it did help to control her headache  Patient notes she does experience some anxiety sometimes during her workday  Diet and Physical Activity  Diet/Nutrition: well balanced diet  Exercise: walking        General Health  Sleep: gets 4-6 hours of sleep on average  Hearing: normal - bilateral   Vision: goes for regular eye exams and wears glasses  Dental: regular dental visits  /GYN Health  Last menstrual period: Postmenopausal   Contraceptive method: Postmenopausal      Review of Systems:     Review of Systems   Constitutional: Negative for chills and fever  HENT: Negative for congestion and sore throat  Eyes: Positive for photophobia (with headaches)  Negative for pain  Respiratory: Negative for cough and shortness of breath  Cardiovascular: Negative for chest pain and palpitations  Gastrointestinal: Positive for nausea (with headaches)  Negative for abdominal pain, constipation, diarrhea and vomiting  Genitourinary: Negative for difficulty urinating and dysuria  Musculoskeletal: Positive for arthralgias (right hip/right groin)  Negative for myalgias  Skin: Negative for rash  Neurological: Positive for headaches (3-4x/month)  Negative for dizziness, weakness, light-headedness and numbness  Psychiatric/Behavioral: Positive for sleep disturbance  Negative for dysphoric mood, self-injury and suicidal ideas  The patient is nervous/anxious         Past Medical History:     Past Medical History:   Diagnosis Date   • Anxiety states 2012      Past Surgical History:     Past Surgical History:   Procedure Laterality Date   •  SECTION      x2   • TUBAL LIGATION        Social History:     Social History     Socioeconomic History   • Marital status: Single     Spouse name: None   • Number of children: None   • Years of education: None   • Highest education level: None   Occupational History   • None   Tobacco Use   • Smoking status: Never   • Smokeless tobacco: Never   Vaping Use   • Vaping Use: Never used   Substance and Sexual Activity   • Alcohol use: Not Currently     Comment: Once a month   • Drug use: Never   • Sexual activity: Not Currently     Birth control/protection: Post-menopausal   Other Topics Concern • None   Social History Narrative   • None     Social Determinants of Health     Financial Resource Strain: Low Risk  (6/23/2023)    Overall Financial Resource Strain (CARDIA)    • Difficulty of Paying Living Expenses: Not hard at all   Food Insecurity: No Food Insecurity (6/23/2023)    Hunger Vital Sign    • Worried About Running Out of Food in the Last Year: Never true    • Ran Out of Food in the Last Year: Never true   Transportation Needs: No Transportation Needs (6/23/2023)    PRAPARE - Transportation    • Lack of Transportation (Medical): No    • Lack of Transportation (Non-Medical): No   Physical Activity: Insufficiently Active (6/4/2020)    Exercise Vital Sign    • Days of Exercise per Week: 4 days    • Minutes of Exercise per Session: 30 min   Stress: No Stress Concern Present (3/12/2020)    [address]    • Feeling of Stress :  Only a little   Social Connections: Socially Isolated (3/12/2020)    Social Connection and Isolation Panel [NHANES]    • Frequency of Communication with Friends and Family: More than three times a week    • Frequency of Social Gatherings with Friends and Family: More than three times a week    • Attends Confucianism Services: Never    • Active Member of Clubs or Organizations: No    • Attends Club or Organization Meetings: Never    • Marital Status: Never    Intimate Partner Violence: Not At Risk (3/12/2020)    Humiliation, Afraid, Rape, and Kick questionnaire    • Fear of Current or Ex-Partner: No    • Emotionally Abused: No    • Physically Abused: No    • Sexually Abused: No   Housing Stability: Not on file       Social Determinants of Health     Tobacco Use: Low Risk  (6/23/2023)    Patient History    • Smoking Tobacco Use: Never    • Smokeless Tobacco Use: Never    • Passive Exposure: Not on file   Alcohol Use: Not on file   Financial Resource Strain: Low Risk  (6/23/2023)    Overall Financial Resource Strain (CARDIA)    • Difficulty of Paying Living Expenses: Not hard at all   Food Insecurity: No Food Insecurity (6/23/2023)    Hunger Vital Sign    • Worried About Running Out of Food in the Last Year: Never true    • Ran Out of Food in the Last Year: Never true   Transportation Needs: No Transportation Needs (6/23/2023)    PRAPARE - Transportation    • Lack of Transportation (Medical): No    • Lack of Transportation (Non-Medical): No   Physical Activity: Insufficiently Active (6/4/2020)    Exercise Vital Sign    • Days of Exercise per Week: 4 days    • Minutes of Exercise per Session: 30 min   Stress: No Stress Concern Present (3/12/2020)    [address]    • Feeling of Stress :  Only a little   Social Connections: Socially Isolated (3/12/2020)    Social Connection and Isolation Panel [NHANES]    • Frequency of Communication with Friends and Family: More than three times a week    • Frequency of Social Gatherings with Friends and Family: More than three times a week    • Attends Faith Services: Never    • Active Member of Clubs or Organizations: No    • Attends Club or Organization Meetings: Never    • Marital Status: Never    Intimate Partner Violence: Not At Risk (3/12/2020)    Humiliation, Afraid, Rape, and Kick questionnaire    • Fear of Current or Ex-Partner: No    • Emotionally Abused: No    • Physically Abused: No    • Sexually Abused: No   Depression: Not at risk (4/12/2023)    PHQ-2    • PHQ-2 Score: 0   Housing Stability: Not on file        Family History:     Family History   Problem Relation Age of Onset   • Osteoporosis Mother    • Leukemia Brother    • Diabetes Brother       Current Medications:     Current Outpatient Medications   Medication Sig Dispense Refill   • hydrOXYzine HCL (ATARAX) 25 mg tablet Take 1 tablet (25 mg total) by mouth every 6 (six) hours as needed for anxiety 30 tablet 1   • naratriptan (AMERGE) 2 5 MG tablet "Take 1 tablet (2 5 mg total) by mouth once as needed for migraine for up to 1 dose may repeat once after 4 hours  Max dose 5 mg/24 hours  10 tablet 2   • Ascorbic Acid (VITAMIN C PO) Take by mouth     • cetirizine (ZyrTEC) 10 mg tablet Take 1 tablet (10 mg total) by mouth daily (Patient not taking: Reported on 11/12/2021 ) 30 tablet 0   • dextromethorphan-guaifenesin (MUCINEX DM)  MG per 12 hr tablet Take 1 tablet by mouth every 12 (twelve) hours as needed for cough (Patient not taking: Reported on 11/12/2021 ) 30 tablet 0   • Diclofenac Sodium (VOLTAREN) 1 % Apply 2 g topically 4 (four) times a day 150 g 1   • fluticasone (FLONASE) 50 mcg/act nasal spray 1 spray into each nostril daily (Patient not taking: Reported on 11/12/2021 ) 11 1 mL 0   • ibuprofen (MOTRIN) 800 mg tablet  (Patient not taking: Reported on 11/12/2021 )     • VITAMIN D PO Take by mouth       No current facility-administered medications for this visit  Allergies:     No Known Allergies   Physical Exam:     /76 (BP Location: Right arm, Patient Position: Sitting, Cuff Size: Standard)   Pulse 88   Temp 98 3 °F (36 8 °C) (Temporal)   Resp 18   Ht 5' 2\" (1 575 m)   Wt 75 8 kg (167 lb 3 2 oz)   LMP  (LMP Unknown)   SpO2 99%   BMI 30 58 kg/m²     Physical Exam  Vitals and nursing note reviewed  Constitutional:       General: She is not in acute distress  Appearance: She is well-developed  HENT:      Head: Normocephalic and atraumatic  Right Ear: Tympanic membrane and external ear normal       Left Ear: Tympanic membrane and external ear normal       Nose: Nose normal    Eyes:      Conjunctiva/sclera: Conjunctivae normal    Cardiovascular:      Rate and Rhythm: Normal rate and regular rhythm  Pulses: Normal pulses  Heart sounds: Normal heart sounds  Pulmonary:      Effort: Pulmonary effort is normal  No respiratory distress  Breath sounds: Normal breath sounds  No wheezing     Abdominal:      General: " Bowel sounds are normal       Palpations: Abdomen is soft  Tenderness: There is no abdominal tenderness  Musculoskeletal:      Cervical back: Normal range of motion and neck supple  Right hip: Tenderness present  Normal range of motion  Normal strength  Skin:     General: Skin is warm and dry  Neurological:      Mental Status: She is alert and oriented to person, place, and time  Cranial Nerves: No facial asymmetry  Sensory: Sensation is intact  Coordination: Coordination is intact     Psychiatric:         Behavior: Behavior normal          HARJEET Julio

## 2023-06-23 NOTE — ASSESSMENT & PLAN NOTE
- Patient had tried taking hydroxyzine 10 mg, but did not find it effective  Will increase dose to 25 mg, every 6 hours as needed for anxiety  - Discussed coping mechanisms   - Recommended starting outpatient mental therapy, but patient declines at this time

## 2023-06-23 NOTE — PATIENT INSTRUCTIONS
1 ) Try taking hydroxyzine as needed during the day for your anxiety  2 ) Try taking naratriptan as needed for headaches  Take as soon as you feel the headache coming on    3 ) Continue taking vitamin D and multivitamin supplement  4 ) Please call central scheduling at [phone] to schedule mammogram  Thanks!

## 2023-09-05 ENCOUNTER — OFFICE VISIT (OUTPATIENT)
Dept: FAMILY MEDICINE CLINIC | Facility: CLINIC | Age: 53
End: 2023-09-05

## 2023-09-05 VITALS
SYSTOLIC BLOOD PRESSURE: 100 MMHG | WEIGHT: 160.4 LBS | HEART RATE: 70 BPM | RESPIRATION RATE: 22 BRPM | TEMPERATURE: 97.9 F | OXYGEN SATURATION: 98 % | HEIGHT: 62 IN | BODY MASS INDEX: 29.52 KG/M2 | DIASTOLIC BLOOD PRESSURE: 56 MMHG

## 2023-09-05 DIAGNOSIS — Z12.11 COLON CANCER SCREENING: ICD-10-CM

## 2023-09-05 DIAGNOSIS — B34.9 VIRAL INFECTION, UNSPECIFIED: Primary | ICD-10-CM

## 2023-09-05 DIAGNOSIS — R51.9 ACUTE NONINTRACTABLE HEADACHE, UNSPECIFIED HEADACHE TYPE: Chronic | ICD-10-CM

## 2023-09-05 DIAGNOSIS — F41.1 ANXIETY STATES: Chronic | ICD-10-CM

## 2023-09-05 DIAGNOSIS — U07.1 COVID-19: ICD-10-CM

## 2023-09-05 LAB
SARS-COV-2 AG UPPER RESP QL IA: POSITIVE
VALID CONTROL: ABNORMAL

## 2023-09-05 PROCEDURE — 99214 OFFICE O/P EST MOD 30 MIN: CPT | Performed by: PHYSICIAN ASSISTANT

## 2023-09-05 PROCEDURE — 87811 SARS-COV-2 COVID19 W/OPTIC: CPT | Performed by: PHYSICIAN ASSISTANT

## 2023-09-05 RX ORDER — VENLAFAXINE HYDROCHLORIDE 37.5 MG/1
CAPSULE, EXTENDED RELEASE ORAL
Qty: 53 CAPSULE | Refills: 1 | Status: SHIPPED | OUTPATIENT
Start: 2023-09-05 | End: 2023-10-05

## 2023-09-05 RX ORDER — NIRMATRELVIR AND RITONAVIR 300-100 MG
3 KIT ORAL 2 TIMES DAILY
Qty: 30 TABLET | Refills: 0 | Status: SHIPPED | OUTPATIENT
Start: 2023-09-05 | End: 2023-09-10

## 2023-09-05 NOTE — LETTER
September 5, 2023     Patient: Mikey Hurst  YOB: 1970  Date of Visit: 9/5/2023      To Whom it May Concern:    Josr Rocky is under my professional care. Nick Ridley was seen in my office on 9/5/2023. Patient has tested positive for COVID-19. Nick Ridley may return to work on 9/10/2023 . If you have any questions or concerns, please don't hesitate to call.          Sincerely,          Padmini Fisher PA-C        CC: No Recipients

## 2023-09-05 NOTE — PROGRESS NOTES
Assessment/Plan:    Anxiety states  - Patient has noticed some improvement with sleeping and her anxiety when taking hydroxyzine. Continue hydroxyzine 25 mg, every 6 hours as needed for anxiety.  -Patient is starting Effexor for migraine preventative therapy. Explained to patient this could also help with controlling anxiety. - Discussed coping mechanisms.  - Recommended starting outpatient mental therapy, but patient declines at this time. Acute nonintractable headache  - Headaches continue to be fairly frequent, occurring 1-2 days at a time every 2 weeks or so. -Reviewed MRI brain without contrast (5/22/2023). Results show mild nonspecific cerebral white matter. No acute abnormality.  - Patient had previously tried taking Topamax, but she experienced an episode where she almost passed out, so she then stopped taking the medication.   -Patient previously had success when taking naratriptan for abortive therapy, so this was prescribed at last visit. However, patient reports it has not been very effective.    -Patient is now interested in trying a different preventative medication. Will prescribe Effexor XR 37.5 mg daily for 7 days, then increase to 75 mg daily.    -Continue naratriptan 2.5 mg once as needed at onset of migraine.  - Advised to continue to work on improving sleep which could also improve migraines.  -Per patient, she is up-to-date with eye exam.  - Encouraged adequate nutrition, sleep, hydration. Diagnoses and all orders for this visit:    Viral infection, unspecified  -     Poct Covid 19 Rapid Antigen Test    Acute nonintractable headache, unspecified headache type  -     venlafaxine (EFFEXOR-XR) 37.5 mg 24 hr capsule; Take 1 capsule (37.5 mg total) by mouth daily with breakfast for 7 days, THEN 2 capsules (75 mg total) daily with breakfast for 23 days. COVID-19  -     nirmatrelvir & ritonavir (Paxlovid, 300/100,) tablet therapy pack;  Take 3 tablets by mouth 2 (two) times a day for 5 days Take 2 nirmatrelvir tablets + 1 ritonavir tablet together per dose    Anxiety states    Colon cancer screening  -     [facility]          All of patients questions were answered. Patient understands and agrees with the above plan. Return in about 4 weeks (around 10/3/2023) for Next scheduled follow up migraines. Ventura Vines PA-C  09/05/23  [address] [facility] [place]          Subjective:     Patient ID: oP Fabian  is a 52 y.o. female with known PMH of anxiety, migraines, varicose veins who presents today in office for headaches follow up. - Patient is a 52 y.o. female who presents today for headaches follow up. Patient notes she continues to have frequent headaches. Patient notes she has tried taking the naratriptan but it does not provide much relief. Patient notes she is able to sleep little better when she takes the hydroxyzine 25 mg. Patient notes for the past few days she has been experiencing body aches, chills, migraine, slight cough. Patient notes she does work on [employer] at [employer] [place].  Also, patient notes she did have some friends visit her from [place] this weekend. Patient did not test herself at home for COVID-19. Patient denies any known fevers, shortness of breath, difficulty breathing, nausea, vomiting, diarrhea, constipation, sore throat. The following portions of the patient's history were reviewed and updated as appropriate: allergies, current medications, past family history, past medical history, past social history, past surgical history and problem list.        Review of Systems   Constitutional: Positive for chills. Negative for fever. HENT: Positive for congestion. Negative for sore throat. Eyes: Positive for photophobia (with headaches). Negative for pain. Respiratory: Positive for cough. Negative for shortness of breath. Cardiovascular: Negative for chest pain and palpitations. Gastrointestinal: Positive for nausea (with headaches).  Negative for abdominal pain, constipation, diarrhea and vomiting. Genitourinary: Negative for difficulty urinating and dysuria. Musculoskeletal: Positive for myalgias. Skin: Negative for rash. Neurological: Positive for headaches (3-4x/month). Negative for dizziness, weakness, light-headedness and numbness. Psychiatric/Behavioral: Positive for sleep disturbance. Negative for dysphoric mood, self-injury and suicidal ideas. The patient is nervous/anxious. Objective:   Vitals:    09/05/23 0825   BP: 100/56   BP Location: Left arm   Patient Position: Sitting   Cuff Size: Standard   Pulse: 70   Resp: 22   Temp: 97.9 °F (36.6 °C)   TempSrc: Temporal   SpO2: 98%   Weight: 72.8 kg (160 lb 6.4 oz)   Height: 5' 2" (1.575 m)         Physical Exam  Vitals and nursing note reviewed. Constitutional:       General: She is not in acute distress. Appearance: She is well-developed. HENT:      Head: Normocephalic and atraumatic. Right Ear: External ear normal.      Left Ear: External ear normal.      Nose: Congestion present. Eyes:      Conjunctiva/sclera: Conjunctivae normal.   Cardiovascular:      Rate and Rhythm: Normal rate and regular rhythm. Pulses: Normal pulses. Heart sounds: Normal heart sounds. Pulmonary:      Effort: Pulmonary effort is normal. No respiratory distress. Breath sounds: Normal breath sounds. No wheezing. Musculoskeletal:      Cervical back: Normal range of motion and neck supple. Skin:     General: Skin is warm and dry. Neurological:      Mental Status: She is alert and oriented to person, place, and time. Cranial Nerves: No facial asymmetry. Sensory: Sensation is intact. Coordination: Coordination is intact.    Psychiatric:         Behavior: Behavior normal.

## 2023-09-07 NOTE — PROGRESS NOTES
COVID-19 Outpatient Progress Note    Assessment/Plan:    Problem List Items Addressed This Visit        Other    Anxiety states (Chronic)     - Patient has noticed some improvement with sleeping and her anxiety when taking hydroxyzine. Continue hydroxyzine 25 mg, every 6 hours as needed for anxiety.  -Patient is starting Effexor for migraine preventative therapy. Explained to patient this could also help with controlling anxiety. - Discussed coping mechanisms.  - Recommended starting outpatient mental therapy, but patient declines at this time. Acute nonintractable headache (Chronic)     - Headaches continue to be fairly frequent, occurring 1-2 days at a time every 2 weeks or so. -Reviewed MRI brain without contrast (5/22/2023). Results show mild nonspecific cerebral white matter. No acute abnormality.  - Patient had previously tried taking Topamax, but she experienced an episode where she almost passed out, so she then stopped taking the medication.   -Patient previously had success when taking naratriptan for abortive therapy, so this was prescribed at last visit. However, patient reports it has not been very effective.    -Patient is now interested in trying a different preventative medication. Will prescribe Effexor XR 37.5 mg daily for 7 days, then increase to 75 mg daily.    -Continue naratriptan 2.5 mg once as needed at onset of migraine.  - Advised to continue to work on improving sleep which could also improve migraines.  -Per patient, she is up-to-date with eye exam.  - Encouraged adequate nutrition, sleep, hydration.          Relevant Medications    venlafaxine (EFFEXOR-XR) 37.5 mg 24 hr capsule   Other Visit Diagnoses     Viral infection, unspecified    -  Primary    Relevant Medications    nirmatrelvir & ritonavir (Paxlovid, 300/100,) tablet therapy pack    Other Relevant Orders    Poct Covid 19 Rapid Antigen Test (Completed)    COVID-19        Relevant Medications    nirmatrelvir & ritonavir (Paxlovid, 300/100,) tablet therapy pack    Colon cancer screening        Relevant Orders    Cologuard         Disposition:     Rapid antigen testing was performed and the result is POSITIVE for COVID-19. Patient has asymptomatic or mild COVID-19 infection. Based off CDC guidelines, they were recommended to isolate for 5 days. If they are asymptomatic or symptoms are improving with no fevers in the past 24 hours, isolation may be ended followed by 5 days of wearing a mask when around othes to minimize risk of infecting others. If still have a fever or other symptoms have not improved, continue to isolate until they improve. Regardless of when they end isolation, avoid being around people who are more likely to get very sick from COVID-19 until at least day 6.     - Advised patient to continue to follow good infection control measures including frequent hand washing, wearing a mask when around others, and to practice social distancing.    - Advised patient to call the office or report to ED if symptoms persist, worsen, or new symptoms arise such as worsening shortness of breath or difficulty breathing. Patient meets criteria for PAXLOVID and they have been counseled appropriately according to EUA documentation released by the FDA. After discussion, patient agrees to treatment. [place] is an investigational medicine used to treat mild-to-moderate COVID-19 in adults and children (15years of age and older weighing at least 80 pounds (40 kg)) with positive results of direct SARS-CoV-2 viral testing, and who are at high risk for progression to severe COVID-19, including hospitalization or death. PAXLOVID is investigational because it is still being studied. There is limited information about the safety and effectiveness of using PAXLOVID to treat people with mild-to-moderate COVID-19.     The FDA has authorized the emergency use of PAXLOVID for the treatment of mild-tomoderate COVID-19 in adults and children (15years of age and older weighing at least 80 pounds (40 kg)) with a positive test for the virus that causes COVID-19, and who are at high risk for progression to severe COVID-19, including hospitalization or death, under an EUA. What should I tell my healthcare provider before I take PAXLOVID? Tell your healthcare provider if you:  - Have any allergies  - Have liver or kidney disease  - Are pregnant or plan to become pregnant  - Are breastfeeding a child  - Have any serious illnesses    Tell your healthcare provider about all the medicines you take, including prescription and over-the-counter medicines, vitamins, and herbal supplements. Some medicines may interact with PAXLOVID and may cause serious side effects. Keep a list of your medicines to show your healthcare provider and pharmacist when you get a new medicine. You can ask your healthcare provider or pharmacist for a list of medicines that interact with PAXLOVID. Do not start taking a new medicine without telling your healthcare provider. Your healthcare provider can tell you if it is safe to take PAXLOVID with other medicines. Tell your healthcare provider if you are taking combined hormonal contraceptive. PAXLOVID may affect how your birth control pills work. Females who are able to become pregnant should use another effective alternative form of contraception or an additional barrier method of contraception. Talk to your healthcare provider if you have any questions about contraceptive methods that might be right for you. How do I take PAXLOVID? PAXLOVID consists of 2 medicines: nirmatrelvir and ritonavir. - Take 2 pink tablets of nirmatrelvir with 1 white tablet of ritonavir by mouth 2 times each day (in the morning and in the evening) for 5 days. For each dose, take all 3 tablets at the same time. - If you have kidney disease, talk to your healthcare provider. You may need a different dose. - Swallow the tablets whole. Do not chew, break, or crush the tablets  - Take PAXLOVID with or without food. - Do not stop taking PAXLOVID without talking to your healthcare provider, even if you feel better. - If you miss a dose of PAXLOVID within 8 hours of the time it is usually taken, take it as soon as you remember. If you miss a dose by more than 8 hours, skip the missed dose and take the next dose at your regular time. Do not take 2 doses of PAXLOVID at the same time. - If you take too much PAXLOVID, call your healthcare provider or go to the nearest hospital emergency room right away. - If you are taking a ritonavir- or cobicistat-containing medicine to treat hepatitis C or Human Immunodeficiency Virus (HIV), you should continue to take your medicine as prescribed by your healthcare provider.  - Talk to your healthcare provider if you do not feel better or if you feel worse after 5 days. Who should generally not take PAXLOVID? Do not take PAXLOVID if:  You are allergic to nirmatrelvir, ritonavir, or any of the ingredients in PAXLOVID. You are taking any of the following medicines:  - Alfuzosin  - Pethidine, piroxicam, propoxyphene  - Ranolazine  - Amiodarone, dronedarone, flecainide, propafenone, quinidine  - Colchicine  - Lurasidone, pimozide, clozapine  - Dihydroergotamine, ergotamine, methylergonovine  - Lovastatin, simvastatin  - Sildenafil (Revatio®) for pulmonary arterial hypertension (PAH)  - Triazolam, oral midazolam  - Apalutamide  - Carbamazepine, phenobarbital, phenytoin  - Rifampin  - Lynette’s Wort (hypericum perforatum)    What are the important possible side effects of PAXLOVID? Possible side effects of PAXLOVID are:  - Liver Problems. Tell your healthcare provider right away if you have any of these signs and symptoms of liver problems: loss of appetite, yellowing of your skin and the whites of eyes (jaundice), dark-colored urine, pale colored stools and itchy skin, stomach area (abdominal) pain.   - Resistance to HIV Medicines. If you have untreated HIV infection, PAXLOVID may lead to some HIV medicines not working as well in the future. - Other possible side effects include: altered sense of taste, diarrhea, high blood pressure, or muscle aches    These are not all the possible side effects of PAXLOVID. Not many people have taken PAXLOVID. Serious and unexpected side effects may happen. [address] [place] [address] is still being studied, so it is possible that all of the risks are not known at this time. What other treatment choices are there? Like June F[place] may allow for the emergency use of other medicines to treat people with COVID-19. Go to https://[employer]/ for information on the emergency use of other medicines that are authorized by FDA to treat people with COVID-19. Your healthcare provider may talk with you about clinical trials for which you may be eligible. It is your choice to be treated or not to be treated with PAXLOVID. Should you decide not to receive it or for your child not to receive it, it will not change your standard medical care. What if I am pregnant or breastfeeding? There is no experience treating pregnant women or breastfeeding mothers with PAXLOVID. For a mother and unborn baby, the benefit of taking PAXLOVID may be greater than the risk from the treatment. If you are pregnant, discuss your options and specific situation with your healthcare provider. It is recommended that you use effective barrier contraception or do not have sexual activity while taking PAXLOVID. If you are breastfeeding, discuss your options and specific situation with your healthcare provider. How do I report side effects with PAXLOVID? Contact your healthcare provider if you have any side effects that bother you or do not go away.     Report side effects to FDA MedWatch at www.fda.gov/medwatch or call 1-203-UTD8675 or you can report side effects to [facility]. at the contact information provided below. Website Fax number Telephone number   www.[facility] [phone] [phone]     How should I store [address] [place] [address]? Store PAXLOVID tablets at room temperature between 68°F to 77°F (20°C to 25°C). Full fact sheet for patients, parents, and caregivers can be found at: [employer]    I have spent a total time of 15 minutes on the day of the encounter for this patient including discussing prognosis, risks and benefits of treatment options, instructions for management, importance of treatment compliance, documenting in the medical record and reviewing/ordering tests, medicine, procedures. Encounter provider: Dania Pa PA-C     Provider located at: [address] [place] [address] [place] [id number] [phone]     Recent Visits  Date Type Provider Dept   09/05/23 Office Visit Padmini Fisher PA-C [facility]   Showing recent visits within past 7 days and meeting all other requirements  Future Appointments  No visits were found meeting these conditions. Showing future appointments within next 150 days and meeting all other requirements     Subjective:   aMdeline Wolf is a 52 y.o. female who is concerned about COVID-19. Patient's symptoms include fatigue, malaise, nasal congestion, rhinorrhea, cough, myalgias and headache. Patient denies fever, chills, sore throat, anosmia, loss of taste, shortness of breath, chest tightness, abdominal pain, nausea, vomiting and diarrhea.      - Date of symptom onset: 9/4/2023      COVID-19 vaccination status: Fully vaccinated (primary series)    Exposure:   Contact with a person who is under investigation (PUI) for or who is positive for COVID-19 within the last 14 days?: No    Hospitalized recently for fever and/or lower respiratory symptoms?: No Currently a healthcare worker that is involved in direct patient care?: No      Works in a special setting where the risk of COVID-19 transmission may be high? (this may include long-term care, correctional and [facility] facilities; homeless shelters; assisted-living facilities and group homes.): No      Resident in a special setting where the risk of COVID-19 transmission may be high? (this may include long-term care, correctional and [facility] facilities; homeless shelters; assisted-living facilities and group homes.): No      Lab Results   Component Value Date    SARSCOV2 Negative 08/02/2021    Rishabh Dahl Not Detected 12/01/2020    SARSCOVAG Positive (A) 09/05/2023       Review of Systems   Constitutional: Positive for fatigue. Negative for chills and fever. HENT: Positive for congestion and rhinorrhea. Negative for sore throat. Respiratory: Positive for cough. Negative for chest tightness and shortness of breath. Gastrointestinal: Negative for abdominal pain, diarrhea, nausea and vomiting. Musculoskeletal: Positive for myalgias. Neurological: Positive for headaches.      Current Outpatient Medications on File Prior to Visit   Medication Sig   • Ascorbic Acid (VITAMIN C PO) Take by mouth   • cetirizine (ZyrTEC) 10 mg tablet Take 1 tablet (10 mg total) by mouth daily (Patient not taking: Reported on 11/12/2021 )   • dextromethorphan-guaifenesin (MUCINEX DM)  MG per 12 hr tablet Take 1 tablet by mouth every 12 (twelve) hours as needed for cough (Patient not taking: Reported on 11/12/2021 )   • Diclofenac Sodium (VOLTAREN) 1 % Apply 2 g topically 4 (four) times a day   • fluticasone (FLONASE) 50 mcg/act nasal spray 1 spray into each nostril daily (Patient not taking: Reported on 11/12/2021 )   • hydrOXYzine HCL (ATARAX) 25 mg tablet Take 1 tablet (25 mg total) by mouth every 6 (six) hours as needed for anxiety   • ibuprofen (MOTRIN) 800 mg tablet  (Patient not taking: Reported on 11/12/2021 )   • naratriptan (AMERGE) 2.5 MG tablet Take 1 tablet (2.5 mg total) by mouth once as needed for migraine for up to 1 dose may repeat once after 4 hours. Max dose 5 mg/24 hours. • VITAMIN D PO Take by mouth       Objective:    /56 (BP Location: Left arm, Patient Position: Sitting, Cuff Size: Standard)   Pulse 70   Temp 97.9 °F (36.6 °C) (Temporal)   Resp 22   Ht 5' 2" (1.575 m)   Wt 72.8 kg (160 lb 6.4 oz)   LMP  (LMP Unknown)   SpO2 98%   BMI 29.34 kg/m²        Physical Exam  Vitals and nursing note reviewed. Constitutional:       General: She is not in acute distress. Appearance: She is well-developed. HENT:      Head: Normocephalic and atraumatic. Right Ear: External ear normal.      Left Ear: External ear normal.      Nose: Congestion present. Mouth/Throat:      Pharynx: Uvula midline. Eyes:      Extraocular Movements: Extraocular movements intact. Conjunctiva/sclera: Conjunctivae normal.      Pupils: Pupils are equal, round, and reactive to light. Cardiovascular:      Rate and Rhythm: Normal rate and regular rhythm. Pulses: Normal pulses. Heart sounds: No murmur heard. Pulmonary:      Effort: Pulmonary effort is normal. No respiratory distress. Breath sounds: Normal breath sounds. No wheezing. Abdominal:      General: Bowel sounds are normal.      Palpations: Abdomen is soft. Tenderness: There is no abdominal tenderness. Musculoskeletal:      Cervical back: Normal range of motion and neck supple. Skin:     General: Skin is warm. Neurological:      Mental Status: She is alert and oriented to person, place, and time. Deep Tendon Reflexes: Reflexes are normal and symmetric.    Psychiatric:         Speech: Speech normal.         Behavior: Behavior normal.       Padmini Fisher PA-C

## 2023-09-07 NOTE — ASSESSMENT & PLAN NOTE
- Patient has noticed some improvement with sleeping and her anxiety when taking hydroxyzine. Continue hydroxyzine 25 mg, every 6 hours as needed for anxiety.  -Patient is starting Effexor for migraine preventative therapy. Explained to patient this could also help with controlling anxiety. - Discussed coping mechanisms.  - Recommended starting outpatient mental therapy, but patient declines at this time.

## 2023-09-07 NOTE — ASSESSMENT & PLAN NOTE
- Headaches continue to be fairly frequent, occurring 1-2 days at a time every 2 weeks or so. -Reviewed MRI brain without contrast (5/22/2023). Results show mild nonspecific cerebral white matter. No acute abnormality.  - Patient had previously tried taking Topamax, but she experienced an episode where she almost passed out, so she then stopped taking the medication.   -Patient previously had success when taking naratriptan for abortive therapy, so this was prescribed at last visit. However, patient reports it has not been very effective.    -Patient is now interested in trying a different preventative medication. Will prescribe Effexor XR 37.5 mg daily for 7 days, then increase to 75 mg daily.    -Continue naratriptan 2.5 mg once as needed at onset of migraine.  - Advised to continue to work on improving sleep which could also improve migraines.  -Per patient, she is up-to-date with eye exam.  - Encouraged adequate nutrition, sleep, hydration.

## 2023-11-08 ENCOUNTER — CONSULT (OUTPATIENT)
Dept: FAMILY MEDICINE CLINIC | Facility: CLINIC | Age: 53
End: 2023-11-08

## 2023-11-08 VITALS
DIASTOLIC BLOOD PRESSURE: 60 MMHG | OXYGEN SATURATION: 96 % | SYSTOLIC BLOOD PRESSURE: 110 MMHG | WEIGHT: 162.2 LBS | HEART RATE: 77 BPM | BODY MASS INDEX: 29.67 KG/M2

## 2023-11-08 DIAGNOSIS — Z01.810 PREOP CARDIOVASCULAR EXAM: Primary | ICD-10-CM

## 2023-11-08 PROBLEM — R21 RASH: Status: RESOLVED | Noted: 2020-06-04 | Resolved: 2023-11-08

## 2023-11-08 PROBLEM — G44.89 OTHER HEADACHE SYNDROME: Status: RESOLVED | Noted: 2020-06-05 | Resolved: 2023-11-08

## 2023-11-08 PROBLEM — M25.551 RIGHT HIP PAIN: Status: RESOLVED | Noted: 2023-04-13 | Resolved: 2023-11-08

## 2023-11-08 PROBLEM — Z20.822 SUSPECTED COVID-19 VIRUS INFECTION: Status: RESOLVED | Noted: 2021-08-02 | Resolved: 2023-11-08

## 2023-11-08 PROCEDURE — 99242 OFF/OP CONSLTJ NEW/EST SF 20: CPT | Performed by: FAMILY MEDICINE

## 2023-11-08 NOTE — PROGRESS NOTES
[facility] PRE-OPERATIVE EVALUATION  [facility] PHYSICIAN GROUP - [address] [facility]    NAME: Isela Galarza  AGE: 52 y.o. SEX: female  : 1970     DATE: 2023    [facility] Pre-Operative Evaluation      Chief Complaint: Pre-operative Evaluation     Surgery: bunionectomy bessiegeorgie, weil #2 hammertoe repair w Rocchio  Anticipated Date of Surgery: 11/10/23     History of Present Illness:     Patient has no prior history of bleeding issues or blood clots. Chronic conditions, medications and allergies were reviewed. Pt with a ho CATHIE    There is no currently active infectious process. Assessment of Cardiac Risk:  No unstable or severe angina or MI in the last 6 weeks or history of stent placement in the last year   No decompensated heart failure (e.g. New onset heart failure, NYHA functional class IV heart failure, or worsening existing heart failure) in past 3 mos. No severe heart valve disease including aortic stenosis or symptomatic mitral stenosis     Exercise Capacity:  Able to walk 4 blocks without symptoms  Able to walk 2 flights without symptoms    NO Prior Anesthesia Reactions       No prolonged steroid use in past 6 mos. P.A.T. If done reviewed. Review of Systems:     Review of Systems   Constitutional:  Negative for fever and unexpected weight change. HENT:  Negative for ear pain, sore throat and trouble swallowing. Eyes:  Negative for pain and visual disturbance. Respiratory:  Negative for cough, chest tightness, shortness of breath and wheezing. Cardiovascular:  Negative for chest pain. Gastrointestinal:  Negative for abdominal distention, abdominal pain, blood in stool, constipation, diarrhea, nausea and vomiting. Endocrine: Negative for polydipsia and polyuria. Genitourinary:  Negative for dysuria and hematuria. Musculoskeletal:  Negative for back pain and myalgias. Skin:  Negative for rash.    Neurological:  Negative for syncope and headaches. Psychiatric/Behavioral:  Negative for suicidal ideas. Current Problem List:     Patient Active Problem List   Diagnosis   • Adjustment insomnia   • Impaired fasting glucose   • Varicose veins of both lower extremities with pain   • Preop cardiovascular exam   • BMI 29.0-29.9,adult   • LLQ pain   • Chronic left-sided low back pain without sciatica   • Acute nonintractable headache   • Anxiety   • Constipation   • Cyst of left ovary   • Vitamin D deficiency   • Vitamin B12 deficiency       Allergies:     No Known Allergies    Current Medications:       Current Outpatient Medications:   •  naratriptan (AMERGE) 2.5 MG tablet, Take 1 tablet (2.5 mg total) by mouth once as needed for migraine for up to 1 dose may repeat once after 4 hours. Max dose 5 mg/24 hours. , Disp: 10 tablet, Rfl: 2  •  VITAMIN D PO, Take by mouth, Disp: , Rfl:   •  venlafaxine (EFFEXOR-XR) 37.5 mg 24 hr capsule, Take 1 capsule (37.5 mg total) by mouth daily with breakfast for 7 days, THEN 2 capsules (75 mg total) daily with breakfast for 23 days. , Disp: 53 capsule, Rfl: 1    Past Medical History:       Past Medical History:   Diagnosis Date   • Anxiety states 2012        Past Surgical History:   Procedure Laterality Date   •  SECTION      x2   • TUBAL LIGATION          Family History   Problem Relation Age of Onset   • Osteoporosis Mother    • Leukemia Brother    • Diabetes Brother         Social History     Socioeconomic History   • Marital status: Single     Spouse name: Not on file   • Number of children: Not on file   • Years of education: Not on file   • Highest education level: Not on file   Occupational History   • Not on file   Tobacco Use   • Smoking status: Never   • Smokeless tobacco: Never   Vaping Use   • Vaping Use: Never used   Substance and Sexual Activity   • Alcohol use: Not Currently     Comment: Once a month   • Drug use: Never   • Sexual activity: Not Currently     Birth control/protection: Post-menopausal   Other Topics Concern   • Not on file   Social History Narrative   • Not on file     Social Determinants of Health     Financial Resource Strain: Low Risk  (6/23/2023)    Overall Financial Resource Strain (CARDIA)    • Difficulty of Paying Living Expenses: Not hard at all   Food Insecurity: No Food Insecurity (6/23/2023)    Hunger Vital Sign    • Worried About Running Out of Food in the Last Year: Never true    • Ran Out of Food in the Last Year: Never true   Transportation Needs: No Transportation Needs (6/23/2023)    PRAPARE - Transportation    • Lack of Transportation (Medical): No    • Lack of Transportation (Non-Medical): No   Physical Activity: Insufficiently Active (6/4/2020)    Exercise Vital Sign    • Days of Exercise per Week: 4 days    • Minutes of Exercise per Session: 30 min   Stress: No Stress Concern Present (3/12/2020)    [facility]    • Feeling of Stress :  Only a little   Social Connections: Socially Isolated (3/12/2020)    Social Connection and Isolation Panel [NHANES]    • Frequency of Communication with Friends and Family: More than three times a week    • Frequency of Social Gatherings with Friends and Family: More than three times a week    • Attends Zoroastrianism Services: Never    • Active Member of Clubs or Organizations: No    • Attends Club or Organization Meetings: Never    • Marital Status: Never    Intimate Partner Violence: Not At Risk (3/12/2020)    Humiliation, Afraid, Rape, and Kick questionnaire    • Fear of Current or Ex-Partner: No    • Emotionally Abused: No    • Physically Abused: No    • Sexually Abused: No   Housing Stability: Not on file        Physical Exam:     /60 (BP Location: Right arm, Patient Position: Sitting, Cuff Size: Standard)   Pulse 77   Wt 73.6 kg (162 lb 3.2 oz)   LMP  (LMP Unknown)   SpO2 96%   BMI 29.67 kg/m²     Physical Exam  Constitutional:       Appearance: She is well-developed. HENT:      Head: Normocephalic and atraumatic. Right Ear: External ear normal.      Left Ear: External ear normal.      Mouth/Throat:      Pharynx: No oropharyngeal exudate. Eyes:      General: No scleral icterus. Extraocular Movements: Extraocular movements intact. Conjunctiva/sclera: Conjunctivae normal.      Pupils: Pupils are equal, round, and reactive to light. Cardiovascular:      Rate and Rhythm: Normal rate and regular rhythm. Heart sounds: No murmur heard. No friction rub. No gallop. Pulmonary:      Effort: Pulmonary effort is normal. No respiratory distress. Breath sounds: Normal breath sounds. No wheezing or rales. Abdominal:      General: Bowel sounds are normal. There is no distension. Palpations: Abdomen is soft. There is no mass. Tenderness: There is no abdominal tenderness. There is no rebound. Musculoskeletal:         General: No swelling. Normal range of motion. Cervical back: Normal range of motion and neck supple. Skin:     General: Skin is warm and dry. Neurological:      Mental Status: She is alert and oriented to person, place, and time. Mental status is at baseline. Psychiatric:         Behavior: Behavior normal.         Operative site has been examined and clear of skin infection and inflammation. Assessment & Recommendations:     Patient is cleared for surgery as detailed above. Surgical Procedure risk category: low risk    Patient specific operative risk categegory: RCRI 0    Note faxed to podiatry.

## 2023-11-09 ENCOUNTER — ANESTHESIA EVENT (OUTPATIENT)
Dept: PERIOP | Facility: HOSPITAL | Age: 53
End: 2023-11-09
Payer: COMMERCIAL

## 2023-11-09 NOTE — PRE-PROCEDURE INSTRUCTIONS
Pre-Surgery Instructions:   Medication Instructions    Cyanocobalamin (VITAMIN B 12 PO) Hold day of surgery. naratriptan (AMERGE) 2.5 MG tablet Stop taking 1 day prior to surgery. NON FORMULARY Hold day of surgery. VITAMIN D PO Hold day of surgery. Phone assessment done day before surgery    Medication instructions for day surgery reviewed. Please use only a sip of water to take your instructed medications. Avoid all over the counter vitamins, supplements and NSAIDS for one week prior to surgery per anesthesia guidelines. Tylenol is ok to take as needed. You will receive a call one business day prior to surgery with an arrival time and hospital directions. If your surgery is scheduled on a Monday, the hospital will be calling you on the Friday prior to your surgery. If you have not heard from anyone by 8pm, please call the hospital supervisor through the hospital  at [phone]. Biancacodie Martin [phone]). Do not eat or drink anything after midnight the night before your surgery, including candy, mints, lifesavers, or chewing gum. Do not drink alcohol 24hrs before your surgery. Try not to smoke at least 24hrs before your surgery. Follow the pre surgery showering instructions as listed in the [facility] Surgical Experience Booklet” or otherwise provided by your surgeon's office. Do not use a blade to shave the surgical area 1 week before surgery. It is okay to use a clean electric clippers up to 24 hours before surgery. Do not apply any lotions, creams, including makeup, cologne, deodorant, or perfumes after showering on the day of your surgery. Do not use dry shampoo, hair spray, hair gel, or any type of hair products. No contact lenses, eye make-up, or artificial eyelashes. Remove nail polish, including gel polish, and any artificial, gel, or acrylic nails if possible. Remove all jewelry including rings and body piercing jewelry. Wear causal clothing that is easy to take on and off. Consider your type of surgery. Keep any valuables, jewelry, piercings at home. Please bring any specially ordered equipment (sling, braces) if indicated. Arrange for a responsible person to drive you to and from the hospital on the day of your surgery. Visitor Guidelines discussed. Call the surgeon's office with any new illnesses, exposures, or additional questions prior to surgery. Please reference your [facility] Surgical Experience Booklet” for additional information to prepare for your upcoming surgery.

## 2023-11-10 ENCOUNTER — APPOINTMENT (OUTPATIENT)
Dept: RADIOLOGY | Facility: HOSPITAL | Age: 53
End: 2023-11-10
Payer: COMMERCIAL

## 2023-11-10 ENCOUNTER — ANESTHESIA (OUTPATIENT)
Dept: PERIOP | Facility: HOSPITAL | Age: 53
End: 2023-11-10
Payer: COMMERCIAL

## 2023-11-10 ENCOUNTER — HOSPITAL ENCOUNTER (OUTPATIENT)
Facility: HOSPITAL | Age: 53
Setting detail: OUTPATIENT SURGERY
Discharge: HOME | End: 2023-11-10
Attending: PODIATRIST | Admitting: PODIATRIST
Payer: COMMERCIAL

## 2023-11-10 VITALS
HEART RATE: 56 BPM | TEMPERATURE: 97.1 F | RESPIRATION RATE: 16 BRPM | OXYGEN SATURATION: 97 % | DIASTOLIC BLOOD PRESSURE: 65 MMHG | SYSTOLIC BLOOD PRESSURE: 116 MMHG

## 2023-11-10 DIAGNOSIS — G89.18 POST-OPERATIVE PAIN: Primary | ICD-10-CM

## 2023-11-10 PROCEDURE — C1713 ANCHOR/SCREW BN/BN,TIS/BN: HCPCS | Performed by: PODIATRIST

## 2023-11-10 PROCEDURE — C1769 GUIDE WIRE: HCPCS | Performed by: PODIATRIST

## 2023-11-10 PROCEDURE — C1776 JOINT DEVICE (IMPLANTABLE): HCPCS | Performed by: PODIATRIST

## 2023-11-10 PROCEDURE — 73630 X-RAY EXAM OF FOOT: CPT

## 2023-11-10 PROCEDURE — NC001 PR NO CHARGE: Performed by: STUDENT IN AN ORGANIZED HEALTH CARE EDUCATION/TRAINING PROGRAM

## 2023-11-10 PROCEDURE — 73620 X-RAY EXAM OF FOOT: CPT

## 2023-11-10 DEVICE — IMPLANTABLE DEVICE
Type: IMPLANTABLE DEVICE | Site: TOE | Status: FUNCTIONAL
Brand: ORTHOLOC 3DI

## 2023-11-10 DEVICE — BIORESORBABLE PIN
Type: IMPLANTABLE DEVICE | Site: TOE | Status: FUNCTIONAL
Brand: SONICPIN

## 2023-11-10 DEVICE — OSTEOSYNTHESIS COMPRESSION STAPLE
Type: IMPLANTABLE DEVICE | Site: TOE | Status: FUNCTIONAL
Brand: EASY CLIP

## 2023-11-10 DEVICE — SCREW CANN 4 X 35MM LNG THRD: Type: IMPLANTABLE DEVICE | Site: TOE | Status: FUNCTIONAL

## 2023-11-10 DEVICE — HAMMERTOE IMPLANT, LARGE
Type: IMPLANTABLE DEVICE | Site: TOE | Status: FUNCTIONAL
Brand: TOETAC

## 2023-11-10 DEVICE — IMPLANTABLE DEVICE
Type: IMPLANTABLE DEVICE | Site: TOE | Status: FUNCTIONAL
Brand: ORTHOLOC

## 2023-11-10 DEVICE — IMPLANTABLE DEVICE
Type: IMPLANTABLE DEVICE | Site: TOE | Status: FUNCTIONAL
Brand: ORTHOLOC™ 2 LAPIFUSE™

## 2023-11-10 RX ORDER — PROPOFOL 10 MG/ML
INJECTION, EMULSION INTRAVENOUS CONTINUOUS PRN
Status: DISCONTINUED | OUTPATIENT
Start: 2023-11-10 | End: 2023-11-10

## 2023-11-10 RX ORDER — DEXAMETHASONE SODIUM PHOSPHATE 10 MG/ML
INJECTION, SOLUTION INTRAMUSCULAR; INTRAVENOUS AS NEEDED
Status: DISCONTINUED | OUTPATIENT
Start: 2023-11-10 | End: 2023-11-10

## 2023-11-10 RX ORDER — FENTANYL CITRATE 50 UG/ML
INJECTION, SOLUTION INTRAMUSCULAR; INTRAVENOUS AS NEEDED
Status: DISCONTINUED | OUTPATIENT
Start: 2023-11-10 | End: 2023-11-10

## 2023-11-10 RX ORDER — CHLORHEXIDINE GLUCONATE ORAL RINSE 1.2 MG/ML
15 SOLUTION DENTAL ONCE
Status: COMPLETED | OUTPATIENT
Start: 2023-11-10 | End: 2023-11-10

## 2023-11-10 RX ORDER — EPHEDRINE SULFATE 50 MG/ML
INJECTION INTRAVENOUS AS NEEDED
Status: DISCONTINUED | OUTPATIENT
Start: 2023-11-10 | End: 2023-11-10

## 2023-11-10 RX ORDER — ONDANSETRON 2 MG/ML
INJECTION INTRAMUSCULAR; INTRAVENOUS AS NEEDED
Status: DISCONTINUED | OUTPATIENT
Start: 2023-11-10 | End: 2023-11-10

## 2023-11-10 RX ORDER — IBUPROFEN 600 MG/1
600 TABLET ORAL EVERY 6 HOURS PRN
Status: CANCELLED | OUTPATIENT
Start: 2023-11-10

## 2023-11-10 RX ORDER — ACETAMINOPHEN 325 MG/1
650 TABLET ORAL EVERY 4 HOURS PRN
Status: CANCELLED | OUTPATIENT
Start: 2023-11-10

## 2023-11-10 RX ORDER — OXYCODONE HYDROCHLORIDE 10 MG/1
5 TABLET ORAL EVERY 4 HOURS PRN
Status: CANCELLED | OUTPATIENT
Start: 2023-11-10

## 2023-11-10 RX ORDER — PROMETHAZINE HYDROCHLORIDE 25 MG/ML
12.5 INJECTION, SOLUTION INTRAMUSCULAR; INTRAVENOUS
Status: DISCONTINUED | OUTPATIENT
Start: 2023-11-10 | End: 2023-11-10 | Stop reason: HOSPADM

## 2023-11-10 RX ORDER — BUPIVACAINE HYDROCHLORIDE 5 MG/ML
INJECTION, SOLUTION EPIDURAL; INTRACAUDAL AS NEEDED
Status: DISCONTINUED | OUTPATIENT
Start: 2023-11-10 | End: 2023-11-10 | Stop reason: HOSPADM

## 2023-11-10 RX ORDER — LIDOCAINE HYDROCHLORIDE 10 MG/ML
INJECTION, SOLUTION EPIDURAL; INFILTRATION; INTRACAUDAL; PERINEURAL AS NEEDED
Status: DISCONTINUED | OUTPATIENT
Start: 2023-11-10 | End: 2023-11-10

## 2023-11-10 RX ORDER — HYDROMORPHONE HCL/PF 1 MG/ML
0.4 SYRINGE (ML) INJECTION
Status: DISCONTINUED | OUTPATIENT
Start: 2023-11-10 | End: 2023-11-10 | Stop reason: HOSPADM

## 2023-11-10 RX ORDER — MIDAZOLAM HYDROCHLORIDE 2 MG/2ML
INJECTION, SOLUTION INTRAMUSCULAR; INTRAVENOUS AS NEEDED
Status: DISCONTINUED | OUTPATIENT
Start: 2023-11-10 | End: 2023-11-10

## 2023-11-10 RX ORDER — MAGNESIUM HYDROXIDE 1200 MG/15ML
LIQUID ORAL AS NEEDED
Status: DISCONTINUED | OUTPATIENT
Start: 2023-11-10 | End: 2023-11-10 | Stop reason: HOSPADM

## 2023-11-10 RX ORDER — ONDANSETRON 2 MG/ML
4 INJECTION INTRAMUSCULAR; INTRAVENOUS ONCE AS NEEDED
Status: DISCONTINUED | OUTPATIENT
Start: 2023-11-10 | End: 2023-11-10 | Stop reason: HOSPADM

## 2023-11-10 RX ORDER — OXYCODONE HYDROCHLORIDE AND ACETAMINOPHEN 5; 325 MG/1; MG/1
1 TABLET ORAL EVERY 4 HOURS PRN
Qty: 30 TABLET | Refills: 0 | Status: SHIPPED | OUTPATIENT
Start: 2023-11-10 | End: 2023-11-20

## 2023-11-10 RX ORDER — FENTANYL CITRATE/PF 50 MCG/ML
50 SYRINGE (ML) INJECTION
Status: DISCONTINUED | OUTPATIENT
Start: 2023-11-10 | End: 2023-11-10 | Stop reason: HOSPADM

## 2023-11-10 RX ORDER — CEFAZOLIN SODIUM 2 G/50ML
2000 SOLUTION INTRAVENOUS ONCE
Status: COMPLETED | OUTPATIENT
Start: 2023-11-10 | End: 2023-11-10

## 2023-11-10 RX ORDER — SODIUM CHLORIDE, SODIUM LACTATE, POTASSIUM CHLORIDE, CALCIUM CHLORIDE 600; 310; 30; 20 MG/100ML; MG/100ML; MG/100ML; MG/100ML
100 INJECTION, SOLUTION INTRAVENOUS CONTINUOUS
Status: DISCONTINUED | OUTPATIENT
Start: 2023-11-10 | End: 2023-11-10 | Stop reason: HOSPADM

## 2023-11-10 RX ORDER — GINSENG 100 MG
CAPSULE ORAL AS NEEDED
Status: DISCONTINUED | OUTPATIENT
Start: 2023-11-10 | End: 2023-11-10 | Stop reason: HOSPADM

## 2023-11-10 RX ADMIN — FENTANYL CITRATE 25 MCG: 50 INJECTION, SOLUTION INTRAMUSCULAR; INTRAVENOUS at 13:44

## 2023-11-10 RX ADMIN — PROPOFOL 100 MCG/KG/MIN: 10 INJECTION, EMULSION INTRAVENOUS at 13:30

## 2023-11-10 RX ADMIN — CHLORHEXIDINE GLUCONATE 0.12% ORAL RINSE 15 ML: 1.2 LIQUID ORAL at 11:06

## 2023-11-10 RX ADMIN — FENTANYL CITRATE 25 MCG: 50 INJECTION, SOLUTION INTRAMUSCULAR; INTRAVENOUS at 14:35

## 2023-11-10 RX ADMIN — FENTANYL CITRATE 50 MCG: 50 INJECTION, SOLUTION INTRAMUSCULAR; INTRAVENOUS at 15:44

## 2023-11-10 RX ADMIN — PROPOFOL 25 MG: 10 INJECTION, EMULSION INTRAVENOUS at 15:24

## 2023-11-10 RX ADMIN — EPHEDRINE SULFATE 5 MG: 50 INJECTION INTRAVENOUS at 14:08

## 2023-11-10 RX ADMIN — EPHEDRINE SULFATE 5 MG: 50 INJECTION INTRAVENOUS at 14:37

## 2023-11-10 RX ADMIN — DEXAMETHASONE SODIUM PHOSPHATE 10 MG: 10 INJECTION, SOLUTION INTRAMUSCULAR; INTRAVENOUS at 13:33

## 2023-11-10 RX ADMIN — PROPOFOL 50 MG: 10 INJECTION, EMULSION INTRAVENOUS at 15:23

## 2023-11-10 RX ADMIN — CEFAZOLIN SODIUM 2000 MG: 2 SOLUTION INTRAVENOUS at 13:30

## 2023-11-10 RX ADMIN — FENTANYL CITRATE 25 MCG: 50 INJECTION, SOLUTION INTRAMUSCULAR; INTRAVENOUS at 14:13

## 2023-11-10 RX ADMIN — FENTANYL CITRATE 25 MCG: 50 INJECTION, SOLUTION INTRAMUSCULAR; INTRAVENOUS at 13:34

## 2023-11-10 RX ADMIN — PROPOFOL 50 MG: 10 INJECTION, EMULSION INTRAVENOUS at 13:44

## 2023-11-10 RX ADMIN — SODIUM CHLORIDE, SODIUM LACTATE, POTASSIUM CHLORIDE, AND CALCIUM CHLORIDE: .6; .31; .03; .02 INJECTION, SOLUTION INTRAVENOUS at 13:24

## 2023-11-10 RX ADMIN — FENTANYL CITRATE 50 MCG: 50 INJECTION, SOLUTION INTRAMUSCULAR; INTRAVENOUS at 15:50

## 2023-11-10 RX ADMIN — MIDAZOLAM 2 MG: 1 INJECTION INTRAMUSCULAR; INTRAVENOUS at 13:22

## 2023-11-10 RX ADMIN — ONDANSETRON 4 MG: 2 INJECTION INTRAMUSCULAR; INTRAVENOUS at 13:33

## 2023-11-10 RX ADMIN — LIDOCAINE HYDROCHLORIDE 50 MG: 10 INJECTION, SOLUTION EPIDURAL; INFILTRATION; INTRACAUDAL; PERINEURAL at 13:30

## 2023-11-10 RX ADMIN — EPHEDRINE SULFATE 5 MG: 50 INJECTION INTRAVENOUS at 14:25

## 2023-11-10 RX ADMIN — EPHEDRINE SULFATE 5 MG: 50 INJECTION INTRAVENOUS at 13:53

## 2023-11-10 NOTE — ANESTHESIA PREPROCEDURE EVALUATION
Medical History    History Comments   Anxiety states    Migraines    Wears partial dentures    Procedure:  BUNIONECTOMY BABAKIDUS, WEIL #2, HAMMERTOE REPAIR (Right: Toe)    Relevant Problems   ANESTHESIA (within normal limits)      MUSCULOSKELETAL   (+) Chronic left-sided low back pain without sciatica      NEURO/PSYCH   (+) Acute nonintractable headache   (+) Anxiety   (+) Chronic left-sided low back pain without sciatica        Physical Exam    Airway    Mallampati score: II  TM Distance: >3 FB  Neck ROM: full     Dental    lower dentures and upper dentures    Cardiovascular  Rate: normal    Pulmonary  Pulmonary exam normal     Other Findings  Per pt denies anything remaining that is loose or removeable      Anesthesia Plan  ASA Score- 2     Anesthesia Type- IV sedation with anesthesia with ASA Monitors. Additional Monitors:     Airway Plan:     Comment: Per patient, appropriately NPO, denies active CP/SOB/wheezing/symptoms related to heartburn/nausea/vomiting  . Plan Factors-Exercise tolerance (METS): >4 METS. Chart reviewed. Patient summary reviewed. Patient is not a current smoker. Induction- intravenous. Postoperative Plan- Plan for postoperative opioid use. Informed Consent- Anesthetic plan and risks discussed with patient. I personally reviewed this patient with the CRNA. Discussed and agreed on the Anesthesia Plan with the CRNA. Jackie Funez

## 2023-11-10 NOTE — DISCHARGE INSTR - AVS FIRST PAGE
[facility]  Dr. Noé Burleson, DPM  Post-Operative Instructions    1. Take your prescribed medication as directed. You can take ibuprofen in between doses of the narcotic if breakthrough pain occurs  2. Upon arrival at home, lie down and elevate your surgical foot on 2 pillows. Unless you're moving from the couch, bed, or bathroom, make sure to elevate the foot. Swelling is not your friend. 3. Remain quiet, off your feet as much as possible, for the first 24-48 hours. This is when your feet first swell and may become painful. After 48 hours you may begin limited walking following these restrictions:   Nonweightbearinbg to surgical foot  4. Drink large quantities of water. Consume no alcohol. Continue a well-balanced diet. 5. Report any unusual discomfort or fever to this office. 6. A limited amount of discomfort and swelling is to be expected. In some cases the skin may take on a bruised appearance. The surgical solution that was applied to your foot prior to the operation is dark in color and the operation site may appear to be oozing when it actually is not. 7. A slight amount of blood is to be expected, and is no cause for alarm. Do not remove the dressings. If there is active bleeding and if the bleeding persists, add additional gauze to the bandage, apply direct pressure, elevate your feet and call the office. 8. Do not get the dressings wet. As regular bathing may be inconvenient, sponge baths are recommended. If you shower, keep the dressing dry. 9. When anesthesia wears off and if any discomfort should be present, apply an ice pack directly over the operated area for 15 minute intervals for several hours or until the pain leaves. (USE IN EXCESS OF 15 MINUTES COULD CAUSE FROSTBITE). Do not use hot water bags or electric pads. A convenient icepack can be made by placing ice cubes in a plastic bag and covering this with a towel. 10. If necessary, take a mild laxative before retiring.   11. Wear your special shoe anytime. It will probably be a few weeks before you will be permitted to try regular shoes. 12. Having performed the operation, we are interested in a prompt recovery. Please cooperate by following the above instructions. 13. Please call to confirm your post-op appointment or call with any other questions.

## 2023-11-10 NOTE — ANESTHESIA POSTPROCEDURE EVALUATION
Post-Op Assessment Note    CV Status:  Stable  Pain Score: 0    Pain management: adequate     Mental Status:  Alert and awake   Hydration Status:  Euvolemic   PONV Controlled:  None   Airway Patency:  Patent      Post Op Vitals Reviewed: Yes      Staff: CRNA         There were no known notable events for this encounter.     BP   133/65   Temp (!) 97.4 °F (36.3 °C) (11/10/23 1539)    Pulse  56   Resp 16 (11/10/23 1539)    SpO2   97

## 2023-11-10 NOTE — OP NOTE
OPERATIVE REPORT - Podiatry  PATIENT NAME: Tracy Yanes    :  1970  MRN: [id number]  Pt Location:  OR ROOM 10    SURGERY DATE: 11/10/2023    Surgeon(s) and Role: Luh Cai DPM - Primary     * Albert Sahni DPM    Pre-op Diagnosis:  Pain in right foot [M79.671]  Hallux valgus (acquired), right foot [M20.11]  Other hammer toe(s) (acquired), right foot [M20.41]    Post-Op Diagnosis Codes:     * Pain in right foot [M79.671]     * Hallux valgus (acquired), right foot [M20.11]     * Other hammer toe(s) (acquired), right foot [M20.41]    Procedure(s) (LRB):  BUNIONECTOMY ROSE MARY, WEIL #2, HAMMERTOE REPAIR (Right)    Specimen(s):  * No specimens in log *    Estimated Blood Loss:   1 mL    Drains:  * No LDAs found *    Anesthesia Type:   IV Sedation with Anesthesia with 20 ml of 1% Lidocaine and 0.5% Bupivacaine in a 1:1 mixture    Hemostasis:  Pneumatic ankle tourniquet set at 250 mmHg for 101 mins  Direct compression, electrocautery    Materials:  Implant Name Type Inv.  Item Serial No.  Lot No. LRB No. Used Action   SCREW MAL 4 X 35MM LNG THRD - [id number]  SCREW MAL 4 X 35MM LNG THRD  ORTHOHELIX [facility]  Right 1 Implanted   PLATE LAPIDUS STD RT - [id number]  PLATE LAPIDUS STD RT  [facility]  Right 1 Implanted   SCREW LCK 3.5 X 18MM FLLY THRD - [id number]  SCREW LCK 3.5 X 18MM FLLY THRD  [facility]  Right 2 Implanted   SCREW JIAN 3.5 X 18MM LOPRFL - [id number]  SCREW JIAN 3.5 X 18MM LOPRFL  [facility]  Right 1 Implanted   SCREW LCK 3.5 X 16MM FLLY THRD - [id number]  SCREW LCK 3.5 X 16MM FLLY THRD  [facility]  Right 2 Implanted   STAPLE EASYCLIP 10 X 10 X 10MM - [id number]  STAPLE EASYCLIP 10 X 10 X 10MM  [employer] [id number] Right 1 Implanted   PIN BIORESORBABLE 2.2 X 22MM - [id number]  PIN BIORESORBABLE 2.2 X 22MM  [employer] [id number] Right 1 Implanted   IMPLANT HAMMERTOE LRG TOE TAC - [id number]  IMPLANT HAMMERTOE LRG TOE TAC  [employer] [id number] Right 1 Implanted       Injectables:  10 mL of 0.5% marcaine plain    Operative Findings:  Consistent with Diagnosis  -Lapifus with interfragmentary screw and plate fixation, reduction of bunion deformity  -Akin osteotomy of hallux proximal phalanx  -Weil osteotomy with shortening of 2nd metatarsal  -Reduction of hammertoe deformity    Complications:   None    Procedure and Technique:     Under mild sedation, the patient was brought into the operating room and placed on the operating room table in the supine position. IV sedation was achieved by anesthesia team and a universal timeout was performed where all parties are in agreement of correct patient, correct procedure and correct site. A pneumatic tourniquet was then placed over the patient's right lower extremity with ample padding. A local anesthetic block was performed consisting of 20 ml of 1% Lidocaine and 0.5% Bupivacaine in a 1:1 mixture. The foot was then prepped and draped in the usual aseptic manner. An esmarch bandage was used to exsangunate the foot and the pneumatic tourniquet was then inflated to 250 mmHg. Attention was first directed to the first interspace were a small stab incision was performed. Soft tissue structures were carefully dissected away to expose the underlying deep structures. Care was taken to identify and retract all vital neural and vascular structures. Utilizing a #15 blade and small scissors, a lateral release was performed of the fibular sesamoidal ligament and adductor tendon and reduction of the hallux was visualized    Attention was the directed to the dorsal aspect of the right 1st TMTJ where an approximately 5cm linear incision was made through skin. Blunt dissection was carried through the subcutaneous tissues, with care taken to protect any neurovascular structures. Electrocautery was used as needed for any bleeders. The EHL tendon was identified and retracted laterally.  Sharp dissection was then continued to the level of the periosteum, which was reflected off of the surface of the medial cuneiform and base of the 1st metatarsal. An osteotome was introduced into the TMTJ and was freed from any soft tissue structures to gain mobility. A threaded K Wire was then inserted from dorsal to plantar at the body of the medial coneiform. A Lapifuse joint distractor was placed into position. A second k wire was then advanced through the distal line of the distractor to secure it in place. The TMTJ was distracted and the cartilagenous surface of the cuneiform and base of 1st metatarsal was removed, and the subchondral bone was then fenestrated with a drill. The distractor was then removed. Attention was then directed to the dorsomedial 1st MPJ where an approximately 4cm linear incision was made through skin. Blunt dissection was carried through the subcutaneous tissues, with care taken to protect any neurovascular structures. Electrocautery was used as needed for any bleeders. The EHL tendon was identified and retracted laterally. Sharp dissection was then continued to the level of the capsule, where a linear capsulotomy was performed. The capsular structures were reflected off of the medial metatarsal head and that medial eminence was exposed. A bicortical k wire was inserted at the head of the first metatarsal from medial to lateral, keeping perpendicular to the shaft of the 2nd metatarsal. A Lapifuse clamp was then positioned over the distal forefoot  and a triplanar bunion correction was performed. Correct position and reduction of deformity was confirmed on C-arm. The k wires over the base of the 1st metatarsal base and cuneiform were removed. A guidewire was inserted from medial to lateral including the base of the 1st metatarsal, medial cuneiform, and intermediate cuneiform. A 35mm compression screw (see implants above) was inserted. The guidewire was removed.  A Lapifuse Standard plate was placed over the 1st TMTJ (see implants above), correct position was verified on C arm. This was secured with a combination of locking screws with a single eccentrically drilled compression screw centrally (see implants above). There was still noted to be a deformity in the right hallux so at this time attention was directed to the proximal phalanx. Incision was drawn out over th proximal phalanx and was carried down through the subcutaneous tissue taking care to retract all vital neurovascular structures. Upon reaching the capsule a linear capsulotomy was performed and the capsule was reflected away from the proximal phalanx. At this time a guidewire was inserted into the lateral cortex of the proximal phalanx and an Akin osteotomy was performed. The lateral hinge remained intact and utilizing proper technique a staple was placed across the osteotomy site. Attention was then directed to the medial aspect of the first metatarsal head. A skin incision was drawn over the medial aspect of the first metatarsal and the incision was carried down through the skin and underlying soft tissue. Care was taken to identify and retract all vital neural and vascular structures. A capsuloptomy was performed over the dorsal aspect of the MPJ. The periosteal and capsular structures were then carefully dissected free. The medial hypertrophy of the 1st metatarsal head was resected by used of sagittal saw. Attention was then directed to the right 2nd toe where a 4 cm linear incision was made encompassing the 2nd MPJ by extending the incision used for the lateral aspect of the Lapifuse jig and lateral release. The incision was semi-elliptical to remove a prominent dorsal callus. All bleeders were ligated as necessary. The extensor tendon was transected and retracted. A transverse capsulotomy was performed exposing met head. A Mcglamry elevator was then utilized to free the met head from its plantar adhesions.  A Weil osteotomy was then performed with sagittal saw and the osteotomy site was fixated with a sonic pin. Attention was directed back to the toe at the level of the PIPJ. The PIPJ was then exposed and the medial and lateral collateral ligaments were incised to expose the head of the proximal phalanx. By use of the toe tac guidelines, the head of the proximal phalanx was prepped using the reaming technique. A reaming technique was also used to prep the middle phalanx through the removal of articular cartilage. The drill was then used to prep for the insertion of the implant into both the middle and proximal phalanx. A 0.045 K wire was placed through the middle phalanx out the distal tuft of the second toe. The toe tac implant was then placed into the middle phalanx first and then into the proximal phalanx after reduction of the PIPJ. Then the k wire was advanced retrograde to maintain corrected positition of the hammertoe. The surgical incisions were irrigated with copious amounts of normal sterile saline. The periosteal and capsular structures were reapproximated using 2-0 vicryl. Subcutaneous closure was obtained utilizing 3-0 vicryl. Skin edges were reapproximated and closure was obtained utilizing 4-0 nylon. The foot was then cleansed and dried. A postoperative injection consisting of 10 ml of 0.5% Bupivacaine was performed. The incision site was dressed with xeroform, gauze. This was then covered with a Torito and Coban    The tourniquet was deflated at approximately 101 min and normal hyperemic response was noted to all digits. The patient tolerated the procedure and anesthesia well without immediate complications and transferred to PACU with vital signs stable. Dr. Melchor Rudolph was present during the entire procedure and participated in all key aspects. SIGNATURE: Anya Madrigal DPM  DATE: November 10, 2023  TIME: 3:38 PM      Portions of the record may have been created with voice recognition software.  Occasional wrong word or "sound a like" substitutions may have occurred due to the inherent limitations of voice recognition software. Read the chart carefully and recognize, using context, where substitutions have occurred.

## 2023-11-10 NOTE — DISCHARGE SUMMARY
Discharge Summary Outpatient Procedure Podiatry -   Rose Falcon 52 y.o. female MRN: [id number]  Unit/Bed#: OR [facility] Encounter: [id number]    Admission Date: 11/10/2023     Admitting Diagnosis: Pain in right foot [M79.671]  Hallux valgus (acquired), right foot [M20.11]  Other hammer toe(s) (acquired), right foot [M20.41]    Discharge Diagnosis: same    Procedures Performed: BUNIONECTOMY Luz Marsh #2, HAMMERTOE REPAIR: 46268 (CPT®)    Complications: none    Condition at Discharge: stable    Discharge instructions/Information to patient and family:   See after visit summary for information provided to patient and family. Provisions for Follow-Up Care/Important appointments:  See after visit summary for information related to follow-up care and any pertinent home health orders. Discharge Medications:  See after visit summary for reconciled discharge medications provided to patient and family.

## 2023-11-10 NOTE — H&P
H&P Interval Update    H&P reviewed. After examining the patient I find no significant changes in the patients condition since the H&P had been written.     Vitals:    11/10/23 1101   BP: 123/67   Pulse: 68   Resp: 16   Temp: (!) 97.4 °F (36.3 °C)   SpO2: 99%       Lucrecia Becerra MD  Department of Anesthesiology and Acute Pain Management  November 10, 2023  11:41 AM

## 2024-01-09 DIAGNOSIS — R51.9 ACUTE NONINTRACTABLE HEADACHE, UNSPECIFIED HEADACHE TYPE: Chronic | ICD-10-CM

## 2024-01-09 RX ORDER — VENLAFAXINE HYDROCHLORIDE 37.5 MG/1
CAPSULE, EXTENDED RELEASE ORAL
Qty: 53 CAPSULE | Refills: 0 | Status: CANCELLED | OUTPATIENT
Start: 2024-01-09 | End: 2024-02-07

## 2024-01-12 RX ORDER — VENLAFAXINE HYDROCHLORIDE 75 MG/1
75 CAPSULE, EXTENDED RELEASE ORAL
Qty: 90 CAPSULE | Refills: 1 | Status: SHIPPED | OUTPATIENT
Start: 2024-01-12

## 2024-02-05 ENCOUNTER — OFFICE VISIT (OUTPATIENT)
Dept: PHYSICAL THERAPY | Facility: REHABILITATION | Age: 54
End: 2024-02-05
Payer: COMMERCIAL

## 2024-02-05 DIAGNOSIS — M79.671 RIGHT FOOT PAIN: Primary | ICD-10-CM

## 2024-02-05 PROCEDURE — 97110 THERAPEUTIC EXERCISES: CPT | Performed by: PHYSICAL THERAPIST

## 2024-02-05 PROCEDURE — 97161 PT EVAL LOW COMPLEX 20 MIN: CPT | Performed by: PHYSICAL THERAPIST

## 2024-02-05 NOTE — PROGRESS NOTES
PT Evaluation     Today's date: 2024  Patient name: Maureen Guerra  : 1970  MRN: [id number]  Referring provider: Regan Parks, PATTI  Dx:   Encounter Diagnosis     ICD-10-CM    1. Right foot pain  M79.671           Start Time: 945  Stop Time: 1020  Total time in clinic (min): 35 minutes    Assessment  Assessment details: Maureen Guerra is a 53 y.o. female presenting with subacute R great toe pain s/p bunionectomy. Primary impairments include decreased mobility, strength, pain balance. Will benefit from skilled PT interventions for reutrn to PLOF HEP was provided and reviewed. Patient is able to complete HEP with good technique and appropriate pain response. Patient expressed understanding of appropriate dosage and frequency of HEP. No additional referral necessary.     Impairments: abnormal coordination, abnormal muscle firing, abnormal or restricted ROM, abnormal movement, activity intolerance, impaired balance, impaired physical strength, lacks appropriate home exercise program, pain with function, poor posture  and poor body mechanics  Functional limitations: walking, standing, stiars  Symptom irritability: moderateUnderstanding of Dx/Px/POC: good   Prognosis: good    Goals    Short Term Goals:  In 4 weeks, the patient will:  1. DF rom to 10  2. First ray ext rom to 45  3. Supervision with HEP for self care    Long Term Goals:  In 8 weeks, the patient will:  1. Pain at worst with prolonged to to <2/10  2. FOTO to greater than predicted value  3. Independent with HEP for selfcare    Plan  Patient would benefit from: skilled physical therapy  Planned therapy interventions: joint mobilization, manual therapy, massage, Rudd taping, neuromuscular re-education, patient education, postural training, self care, strengthening, stretching, therapeutic activities, therapeutic exercise, therapeutic training, graded exercise, graded activity, home exercise program, flexibility, functional ROM exercises,  balance and activity modification  Frequency: 2x week  Duration in weeks: 8  Treatment plan discussed with: patient      Subjective  Pain Location: R foot/great toe, s/p bunionectomy (11/10/23)  Pain Intensity: Intermittent 0-5/10  Provoked by: weight shifting, mid stance,   Eased Positions: rest  Constitutional S/S: denies   Goals: pain free walking, wtb tolerance,      Objective    Red Flags: none    Standing Posture & Lower Extremity Alignment:  Structure/Joint         Knee - Sagittal x Genurecurvatum  Neutral     Rearfoot x Valgus  Neutral  Varus   Forefoot x Abducted  Neutral     Arch x Pes Planus  Neutral  Pes Cavus     Range of Motion: Goniometric measurements revealed the following findings (in degrees).  Joint Motion Right: 2/5/2024 Left: 2/5/2024   Ankle Dorsiflexion 5 10   Ankle Plantarflexion WNL WNL   Ankle Supination WNL WNL   Ankle Pronation WNL WNL   Great toe ext 30 WNL     Strength: MMT revealed the following findings.  Joint Motion Right: 2/5/2024 Left: 2/5/2024   Ankle Plantarflexion 4+/5 5/5   Ankle Dorsiflexion 4/5 5/5   Ankle Supination 4/5 5/5   Ankle Pronation 4/5 5/5     Additional Assessments:  Palpation: TTP incission slight, pain plantar aspect of MTP with first ray ext stretching  Gait Pattern: Antagia in mid stance on R  Balance: SLS 10s R 30s L    Special Tests: Not indicated         Precautions: universal    POC expires Unit limit Auth Expiration date PT/OT + Visit Limit?   4/5/24 na na na         Visit/Unit Tracking  AUTH Status:    BOMN         Pertinent Findings:      POC End Date: 4/5/24                                                                                          Test / Measure  9/15/2022   FOTO (Predicted 70) 51   DF ROM  5   First ray ext 30       Visit 1            Manuals             First MTP mobs             First ray ext S                                       Neuro Re-Ed             Toga             Nose leans             HR             TR             Foam SLS                                        Ther Ex             HEP Review 8'                                                                                                       Ther Activity                                       Gait Training                                       Modalities

## 2024-02-08 ENCOUNTER — OFFICE VISIT (OUTPATIENT)
Dept: PHYSICAL THERAPY | Facility: REHABILITATION | Age: 54
End: 2024-02-08
Payer: COMMERCIAL

## 2024-02-08 DIAGNOSIS — M79.671 RIGHT FOOT PAIN: Primary | ICD-10-CM

## 2024-02-08 PROCEDURE — 97110 THERAPEUTIC EXERCISES: CPT | Performed by: PHYSICAL THERAPIST

## 2024-02-08 PROCEDURE — 97140 MANUAL THERAPY 1/> REGIONS: CPT | Performed by: PHYSICAL THERAPIST

## 2024-02-08 PROCEDURE — 97112 NEUROMUSCULAR REEDUCATION: CPT | Performed by: PHYSICAL THERAPIST

## 2024-02-08 NOTE — PROGRESS NOTES
"Daily Note     Today's date: 2024  Patient name: Maureen Guerra  : 1970  MRN: [id number]  Referring provider: Regan Parks PT  Dx:   Encounter Diagnosis     ICD-10-CM    1. Right foot pain  M79.671           Start Time: 1050  Stop Time: 1130  Total time in clinic (min): 40 minutes    Subjective: Reports moderate improvement in s/s.     Objective: See treatment diary below    Assessment: Tolerated treatment well. Patient demonstrated fatigue post treatment, exhibited good technique with therapeutic exercises, and would benefit from continued PT 1:1 with Regan Parks DPT for entirety of tx. Improved trendelenburg gait compensation with verbal cues.       Plan: Continue per plan of care.      Precautions: universal    POC expires Unit limit Auth Expiration date PT/OT + Visit Limit?   24 na na na         Visit/Unit Tracking  AUTH Status:    BOMN         Pertinent Findings:      POC End Date: 24                                                                                          Test / Measure     FOTO (Predicted 70) 51   DF ROM  5   First ray ext 30       Visit 1 2           Manuals             First MTP mobs  G3-4 KS           First ray ext S  6x30\"                                     Neuro Re-Ed             Toga  3'           Nose leans  5\"x15           HR  2x20           TR  2x20           Foam SLS  3x30\"                                     Ther Ex             HEP Review 8'            Nustep  8'                                                                                         Ther Activity                                       Gait Training                                       Modalities                                            "

## 2024-02-14 ENCOUNTER — OFFICE VISIT (OUTPATIENT)
Dept: PHYSICAL THERAPY | Facility: REHABILITATION | Age: 54
End: 2024-02-14
Payer: COMMERCIAL

## 2024-02-14 DIAGNOSIS — M79.671 RIGHT FOOT PAIN: Primary | ICD-10-CM

## 2024-02-14 PROCEDURE — 97140 MANUAL THERAPY 1/> REGIONS: CPT | Performed by: PHYSICAL THERAPIST

## 2024-02-14 PROCEDURE — 97110 THERAPEUTIC EXERCISES: CPT | Performed by: PHYSICAL THERAPIST

## 2024-02-14 PROCEDURE — 97112 NEUROMUSCULAR REEDUCATION: CPT | Performed by: PHYSICAL THERAPIST

## 2024-02-14 NOTE — PROGRESS NOTES
"Daily Note     Today's date: 2024  Patient name: Maureen Guerra  : 1970  MRN: [id number]  Referring provider: Regan Parks PT  Dx:   Encounter Diagnosis     ICD-10-CM    1. Right foot pain  M79.671           Start Time: 1000  Stop Time: 1040  Total time in clinic (min): 40 minutes    Subjective: Increased midfoot pain with increased walking at work.     Objective: See treatment diary below    Assessment: Tolerated treatment well. Patient demonstrated fatigue post treatment, exhibited good technique with therapeutic exercises, and would benefit from continued PT 1:1 with Regan Parks DPT for entirety of tx.    Plan: Continue per plan of care.      Precautions: universal    POC expires Unit limit Auth Expiration date PT/OT + Visit Limit?   24 na na na         Visit/Unit Tracking  AUTH Status:    BOMN         Pertinent Findings:      POC End Date: 24                                                                                          Test / Measure     FOTO (Predicted 70) 51   DF ROM  5   First ray ext 30       Visit 1 2 3   Manuals    First MTP mobs  G3-4 KS G3-4 KS   First ray ext S  6x30\" 6x30\"               Neuro Re-Ed      Toga  3'    Nose leans  5\"x15    HR  2x20 2x20   TR  2x20 2x20   Foam SLS  3x30\"    TB EV   X20 mtb   TB IV   X20 mtb   TB DF   X20 mtb         Ther Ex      HEP Review 8'     Nustep  8' 8'   prostretch   3x30\" ea                                 Ther Activity                  Gait Training                  Modalities                         "

## 2024-02-16 ENCOUNTER — OFFICE VISIT (OUTPATIENT)
Dept: PHYSICAL THERAPY | Facility: REHABILITATION | Age: 54
End: 2024-02-16
Payer: COMMERCIAL

## 2024-02-16 DIAGNOSIS — M79.671 RIGHT FOOT PAIN: Primary | ICD-10-CM

## 2024-02-16 PROCEDURE — 97140 MANUAL THERAPY 1/> REGIONS: CPT | Performed by: PHYSICAL THERAPIST

## 2024-02-16 NOTE — PROGRESS NOTES
"Daily Note     Today's date: 2024  Patient name: Maureen Guerra  : 1970  MRN: [id number]  Referring provider: Regan Parks, PATTI  Dx:   Encounter Diagnosis     ICD-10-CM    1. Right foot pain  M79.671           Start Time: 1000  Stop Time: 1040  Total time in clinic (min): 40 minutes    Subjective: Progressing well, reduced pain with walking but still has pain with long days on feet.     Objective: See treatment diary below    Assessment: Tolerated treatment well. Patient demonstrated fatigue post treatment, exhibited good technique with therapeutic exercises, and would benefit from continued PT 1:1 with Regan Parks DPT for 25mins of tx.     Plan: Continue per plan of care.      Precautions: universal    POC expires Unit limit Auth Expiration date PT/OT + Visit Limit?   24 na na na         Visit/Unit Tracking  AUTH Status:    BOMN         Pertinent Findings:      POC End Date: 24                                                                                          Test / Measure     FOTO (Predicted 70) 51   DF ROM  5   First ray ext 30       Visit 1 2 3 4   Manuals    First MTP mobs  G3-4 KS G3-4 KS KS   First ray ext S  6x30\" 6x30\" 6x30\"                 Neuro Re-Ed       Toga  3'     Nose leans  5\"x15     HR  2x20 2x20 2x20   TR  2x20 2x20 2x20   Foam SLS  3x30\"     TB EV   X20 mtb X20 mtb   TB IV   X20 mtb X20 mtb   TB DF   X20 mtb X20 mtb          Ther Ex       HEP Review 8'      Nustep  8' 8' 8'   prostretch   3x30\" ea 3x30\" ea                                      Ther Activity                     Gait Training                     Modalities                              "

## 2024-02-19 ENCOUNTER — APPOINTMENT (OUTPATIENT)
Dept: PHYSICAL THERAPY | Facility: REHABILITATION | Age: 54
End: 2024-02-19
Payer: COMMERCIAL

## 2024-02-23 ENCOUNTER — OFFICE VISIT (OUTPATIENT)
Dept: PHYSICAL THERAPY | Facility: REHABILITATION | Age: 54
End: 2024-02-23
Payer: COMMERCIAL

## 2024-02-23 DIAGNOSIS — M79.671 RIGHT FOOT PAIN: Primary | ICD-10-CM

## 2024-02-23 PROCEDURE — 97140 MANUAL THERAPY 1/> REGIONS: CPT | Performed by: PHYSICAL THERAPIST

## 2024-02-23 NOTE — PROGRESS NOTES
"Daily Note     Today's date: 2024  Patient name: Maureen Guerra  : 1970  MRN: [id number]  Referring provider: Regan Parks PT  Dx:   Encounter Diagnosis     ICD-10-CM    1. Right foot pain  M79.671           Start Time: 1050  Stop Time: 1120  Total time in clinic (min): 30 minutes    Subjective: Reports decreased pain with MD prescribed NSAIDS.     Objective: See treatment diary below    Assessment: Tolerated treatment well. Patient demonstrated fatigue post treatment, exhibited good technique with therapeutic exercises, and would benefit from continued PT 1:1 with Regna Parks DPT for entirety of tx.     Plan: Continue per plan of care.      Precautions: universal    POC expires Unit limit Auth Expiration date PT/OT + Visit Limit?   24 na na na         Visit/Unit Tracking  AUTH Status:    BOMN         Pertinent Findings:      POC End Date: 24                                                                                          Test / Measure     FOTO (Predicted 70) 51   DF ROM  5   First ray ext 30       Visit 1 2 3 4 5   Manuals    First MTP mobs  G3-4 KS G3-4 KS KS KS   First ray ext S  6x30\" 6x30\" 6x30\" 6x30\"                   Neuro Re-Ed        Toga  3'      Nose leans  5\"x15      HR  2x20 2x20 2x20 2x20   TR  2x20 2x20 2x20 2x20   Foam SLS  3x30\"      TB EV   X20 mtb X20 mtb X20 mtb   TB IV   X20 mtb X20 mtb X20 mtb   TB DF   X20 mtb X20 mtb X20 mtb           Ther Ex        HEP Review 8'       Nustep  8' 8' 8' 8'   prostretch   3x30\" ea 3x30\" ea 3x30\" ea                                           Ther Activity                        Gait Training                        Modalities                                   "

## 2024-02-27 ENCOUNTER — OFFICE VISIT (OUTPATIENT)
Dept: PHYSICAL THERAPY | Facility: REHABILITATION | Age: 54
End: 2024-02-27
Payer: COMMERCIAL

## 2024-02-27 DIAGNOSIS — M79.671 RIGHT FOOT PAIN: Primary | ICD-10-CM

## 2024-02-27 PROCEDURE — 97110 THERAPEUTIC EXERCISES: CPT | Performed by: PHYSICAL THERAPIST

## 2024-02-27 PROCEDURE — 97140 MANUAL THERAPY 1/> REGIONS: CPT | Performed by: PHYSICAL THERAPIST

## 2024-02-27 NOTE — PROGRESS NOTES
"Daily Note     Today's date: 2024  Patient name: Maureen Guerra  : 1970  MRN: [id number]  Referring provider: Regan Parks, PATIT  Dx:   Encounter Diagnosis     ICD-10-CM    1. Right foot pain  M79.671           Start Time: 1045  Stop Time: 1125  Total time in clinic (min): 40 minutes    Subjective: Pt     Objective: See treatment diary below    Assessment: Tolerated treatment well. Patient demonstrated fatigue post treatment, exhibited good technique with therapeutic exercises, and would benefit from continued PT 1:1 with Regan Parks DPT for entirety of tx.     Plan: Continue per plan of care.      Precautions: universal    POC expires Unit limit Auth Expiration date PT/OT + Visit Limit?   24 na na na         Visit/Unit Tracking  AUTH Status:    BOMN         Pertinent Findings:      POC End Date: 24                                                                                          Test / Measure     FOTO (Predicted 70) 51   DF ROM  5   First ray ext 30       Visit 1 2 3 4 5 6   Manuals    First MTP mobs  G3-4 KS G3-4 KS KS KS KS   First ray ext S  6x30\" 6x30\" 6x30\" 6x30\" 6x30\"                     Neuro Re-Ed         Toga  3'       Nose leans  5\"x15       HR  2x20 2x20 2x20 2x20 2x20   TR  2x20 2x20 2x20 2x20 2x20   Foam SLS  3x30\"       TB EV   X20 mtb X20 mtb X20 mtb x20mtb   TB IV   X20 mtb X20 mtb X20 mtb x20mtb   TB DF   X20 mtb X20 mtb X20 mtb x20mtb            Ther Ex         HEP Review 8'        Nustep  8' 8' 8' 8' 8'   prostretch   3x30\" ea 3x30\" ea 3x30\" ea 3x30\"                                                Ther Activity                           Gait Training                           Modalities                                        "

## 2024-03-01 ENCOUNTER — OFFICE VISIT (OUTPATIENT)
Dept: PHYSICAL THERAPY | Facility: REHABILITATION | Age: 54
End: 2024-03-01
Payer: COMMERCIAL

## 2024-03-01 DIAGNOSIS — M79.671 RIGHT FOOT PAIN: Primary | ICD-10-CM

## 2024-03-01 PROCEDURE — 97110 THERAPEUTIC EXERCISES: CPT | Performed by: PHYSICAL THERAPIST

## 2024-03-01 PROCEDURE — 97140 MANUAL THERAPY 1/> REGIONS: CPT | Performed by: PHYSICAL THERAPIST

## 2024-03-01 NOTE — PROGRESS NOTES
"Daily Note     Today's date: 3/1/2024  Patient name: Maureen Guerra  : 1970  MRN: [id number]  Referring provider: Regan Parks PT  Dx:   Encounter Diagnosis     ICD-10-CM    1. Right foot pain  M79.671           Start Time: 09  Stop Time: 0950  Total time in clinic (min): 30 minutes    Subjective: Feels walking in progressing    Objective: See treatment diary below    Assessment: Tolerated treatment well. Patient demonstrated fatigue post treatment, exhibited good technique with therapeutic exercises, and would benefit from continued PT 1:1 with Regan Parks DPT for entirety of tx. Tx abbreviated due to late arrival.     Plan: Continue per plan of care.      Precautions: universal    POC expires Unit limit Auth Expiration date PT/OT + Visit Limit?   24 na na na         Visit/Unit Tracking  AUTH Status:    BOMN         Pertinent Findings:      POC End Date: 24                                                                                          Test / Measure  2   FOTO (Predicted 70) 51   DF ROM  5   First ray ext 30       Visit 1 2 3 4 5 6 7   Manuals 2/5 2/8 2/14 2/16 2/23 2/27 3/1   First MTP mobs  G3-4 KS G3-4 KS KS KS KS KS   First ray ext S  6x30\" 6x30\" 6x30\" 6x30\" 6x30\" 6x30\"                       Neuro Re-Ed          Toga  3'        Nose leans  5\"x15        HR  2x20 2x20 2x20 2x20 2x20 2x20   TR  2x20 2x20 2x20 2x20 2x20 2x20   Foam SLS  3x30\"        TB EV   X20 mtb X20 mtb X20 mtb x20mtb x20mtb   TB IV   X20 mtb X20 mtb X20 mtb x20mtb x20mtb   TB DF   X20 mtb X20 mtb X20 mtb x20mtb x20mtb             Ther Ex          HEP Review 8'         Nustep  8' 8' 8' 8' 8'    prostretch   3x30\" ea 3x30\" ea 3x30\" ea 3x30\" 3x30\"                                                     Ther Activity                              Gait Training                              Modalities                                             "

## 2024-03-04 ENCOUNTER — OFFICE VISIT (OUTPATIENT)
Dept: PHYSICAL THERAPY | Facility: REHABILITATION | Age: 54
End: 2024-03-04
Payer: COMMERCIAL

## 2024-03-04 DIAGNOSIS — M79.671 RIGHT FOOT PAIN: Primary | ICD-10-CM

## 2024-03-04 PROCEDURE — 97140 MANUAL THERAPY 1/> REGIONS: CPT | Performed by: PHYSICAL THERAPIST

## 2024-03-04 PROCEDURE — 97110 THERAPEUTIC EXERCISES: CPT | Performed by: PHYSICAL THERAPIST

## 2024-03-04 NOTE — PROGRESS NOTES
"Daily Note     Today's date: 3/4/2024  Patient name: Maureen Guerra  : 1970  MRN: [id number]  Referring provider: Regan Parks, PATTI  Dx:   Encounter Diagnosis     ICD-10-CM    1. Right foot pain  M79.671           Start Time: 1220  Stop Time: 1300  Total time in clinic (min): 40 minutes    Subjective: Pt reports some pain with continued walking but     Objective: See treatment diary below    Assessment: Tolerated treatment well. Patient demonstrated fatigue post treatment and would benefit from continued PT 1:1 with Regan Parks DPT for entirety of tx. '  Pt tolerated tx well, continues to display improved gait quality.    Plan: Continue per plan of care.  Reduce frequency to 1/wk.      Precautions: universal    POC expires Unit limit Auth Expiration date PT/OT + Visit Limit?   24 na na na         Visit/Unit Tracking  AUTH Status:    BOMN         Pertinent Findings:      POC End Date: 24                                                                                          Test / Measure  2   FOTO (Predicted 70) 51   DF ROM  5   First ray ext 30       Visit 1 2 3 4 5 6 7 8   Manuals 2/5 2/8 2/14 2/16 2/23 2/27 3/1 3/4   First MTP mobs  G3-4 KS G3-4 KS KS KS KS KS KS   First ray ext S  6x30\" 6x30\" 6x30\" 6x30\" 6x30\" 6x30\" 6x30\"                         Neuro Re-Ed           Toga  3'         Nose leans  5\"x15         HR  2x20 2x20 2x20 2x20 2x20 2x20 2x20   TR  2x20 2x20 2x20 2x20 2x20 2x20 2x20   Foam SLS  3x30\"         TB EV   X20 mtb X20 mtb X20 mtb x20mtb x20mtb x20mtb   TB IV   X20 mtb X20 mtb X20 mtb x20mtb x20mtb x20mtb   TB DF   X20 mtb X20 mtb X20 mtb x20mtb x20mtb x20mtb              Ther Ex           HEP Review 8'          Nustep  8' 8' 8' 8' 8'  8' RB   prostretch   3x30\" ea 3x30\" ea 3x30\" ea 3x30\" 3x30\" 3x30\"                                                          Ther Activity                                 Gait Training                                 Modalities            "

## 2024-03-12 ENCOUNTER — APPOINTMENT (OUTPATIENT)
Dept: PHYSICAL THERAPY | Facility: REHABILITATION | Age: 54
End: 2024-03-12
Payer: COMMERCIAL

## 2024-03-15 ENCOUNTER — OFFICE VISIT (OUTPATIENT)
Dept: PHYSICAL THERAPY | Facility: REHABILITATION | Age: 54
End: 2024-03-15
Payer: COMMERCIAL

## 2024-03-15 DIAGNOSIS — M79.671 RIGHT FOOT PAIN: Primary | ICD-10-CM

## 2024-03-15 PROCEDURE — 97140 MANUAL THERAPY 1/> REGIONS: CPT | Performed by: PHYSICAL THERAPIST

## 2024-03-15 PROCEDURE — 97110 THERAPEUTIC EXERCISES: CPT | Performed by: PHYSICAL THERAPIST

## 2024-03-15 NOTE — PROGRESS NOTES
"Daily Note     Today's date: 3/15/2024  Patient name: Maureen Guerra  : 1970  MRN: [id number]  Referring provider: Regan Parks PT  Dx:   Encounter Diagnosis     ICD-10-CM    1. Right foot pain  M79.671           Start Time: 1115  Stop Time: 1155  Total time in clinic (min): 40 minutes    Subjective: Pt reports progressing well, reduced pain and improved ambulation tolerance.     Objective: See treatment diary below    Assessment: Tolerated treatment well. Patient demonstrated fatigue post treatment, exhibited good technique with therapeutic exercises, and would benefit from continued PT 1:1 with Regan Parks DPT for entirety of tx. Progressing well, improved   Walking tolerance and CKC mid foot control. Continue per POC.    Plan: Potential discharge next visit.     Precautions: universal    POC expires Unit limit Auth Expiration date PT/OT + Visit Limit?   24 na na na         Visit/Unit Tracking  AUTH Status:    BOMN         Pertinent Findings:      POC End Date: 24                                                                                          Test / Measure     FOTO (Predicted 70) 51   DF ROM  5   First ray ext 30       Visit 6 7 8 9   Manuals 2/27 3/1 3/4 3/15   First MTP mobs KS KS KS KS   First ray ext S 6x30\" 6x30\" 6x30\" 6x30\"                 Neuro Re-Ed       Toga       Nose leans       HR 2x20 2x20 2x20 2x20   TR 2x20 2x20 2x20 2x20   Foam SLS       TB EV x20mtb x20mtb x20mtb x20mtb   TB IV x20mtb x20mtb x20mtb x20mtb   TB DF x20mtb x20mtb x20mtb x20mtb          Ther Ex       HEP Review       Nustep 8'  8' RB    prostretch 3x30\" 3x30\" 3x30\" 3x30\"                                      Ther Activity                     Gait Training                     Modalities                                        "

## 2024-03-19 ENCOUNTER — OFFICE VISIT (OUTPATIENT)
Dept: PHYSICAL THERAPY | Facility: REHABILITATION | Age: 54
End: 2024-03-19
Payer: COMMERCIAL

## 2024-03-19 DIAGNOSIS — M79.671 RIGHT FOOT PAIN: Primary | ICD-10-CM

## 2024-03-19 PROCEDURE — 97110 THERAPEUTIC EXERCISES: CPT | Performed by: PHYSICAL THERAPIST

## 2024-03-19 PROCEDURE — 97140 MANUAL THERAPY 1/> REGIONS: CPT | Performed by: PHYSICAL THERAPIST

## 2024-06-20 ENCOUNTER — OFFICE VISIT (OUTPATIENT)
Dept: FAMILY MEDICINE CLINIC | Facility: CLINIC | Age: 54
End: 2024-06-20

## 2024-06-20 ENCOUNTER — APPOINTMENT (OUTPATIENT)
Dept: LAB | Facility: HOSPITAL | Age: 54
End: 2024-06-20
Payer: COMMERCIAL

## 2024-06-20 VITALS
SYSTOLIC BLOOD PRESSURE: 107 MMHG | WEIGHT: 168 LBS | DIASTOLIC BLOOD PRESSURE: 65 MMHG | TEMPERATURE: 97.6 F | HEIGHT: 62 IN | RESPIRATION RATE: 18 BRPM | OXYGEN SATURATION: 98 % | BODY MASS INDEX: 30.91 KG/M2 | HEART RATE: 66 BPM

## 2024-06-20 DIAGNOSIS — R51.9 ACUTE NONINTRACTABLE HEADACHE, UNSPECIFIED HEADACHE TYPE: Chronic | ICD-10-CM

## 2024-06-20 DIAGNOSIS — E55.9 VITAMIN D DEFICIENCY: Chronic | ICD-10-CM

## 2024-06-20 DIAGNOSIS — R30.0 DYSURIA: ICD-10-CM

## 2024-06-20 DIAGNOSIS — E53.8 VITAMIN B12 DEFICIENCY: Chronic | ICD-10-CM

## 2024-06-20 DIAGNOSIS — F41.9 ANXIETY: ICD-10-CM

## 2024-06-20 DIAGNOSIS — Z00.00 HEALTHCARE MAINTENANCE: ICD-10-CM

## 2024-06-20 DIAGNOSIS — R30.0 DYSURIA: Primary | ICD-10-CM

## 2024-06-20 LAB
ALBUMIN SERPL BCG-MCNC: 4.3 G/DL (ref 3.5–5)
ALP SERPL-CCNC: 51 U/L (ref 34–104)
ALT SERPL W P-5'-P-CCNC: 18 U/L (ref 7–52)
ANION GAP SERPL CALCULATED.3IONS-SCNC: 11 MMOL/L (ref 4–13)
AST SERPL W P-5'-P-CCNC: 21 U/L (ref 13–39)
BACTERIA UR QL AUTO: NORMAL /HPF
BASOPHILS # BLD AUTO: 0.02 THOUSANDS/ÂΜL (ref 0–0.1)
BASOPHILS NFR BLD AUTO: 0 % (ref 0–1)
BILIRUB SERPL-MCNC: 0.67 MG/DL (ref 0.2–1)
BILIRUB UR QL STRIP: NEGATIVE
BUN SERPL-MCNC: 18 MG/DL (ref 5–25)
CALCIUM SERPL-MCNC: 9.7 MG/DL (ref 8.4–10.2)
CHLORIDE SERPL-SCNC: 102 MMOL/L (ref 96–108)
CHOLEST SERPL-MCNC: 177 MG/DL
CLARITY UR: CLEAR
CO2 SERPL-SCNC: 26 MMOL/L (ref 21–32)
COLOR UR: YELLOW
CREAT SERPL-MCNC: 0.9 MG/DL (ref 0.6–1.3)
EOSINOPHIL # BLD AUTO: 0.04 THOUSAND/ÂΜL (ref 0–0.61)
EOSINOPHIL NFR BLD AUTO: 1 % (ref 0–6)
ERYTHROCYTE [DISTWIDTH] IN BLOOD BY AUTOMATED COUNT: 13.1 % (ref 11.6–15.1)
GFR SERPL CREATININE-BSD FRML MDRD: 71 ML/MIN/1.73SQ M
GLUCOSE SERPL-MCNC: 97 MG/DL (ref 65–140)
GLUCOSE UR STRIP-MCNC: NEGATIVE MG/DL
HCT VFR BLD AUTO: 37.7 % (ref 34.8–46.1)
HDLC SERPL-MCNC: 60 MG/DL
HGB BLD-MCNC: 13 G/DL (ref 11.5–15.4)
HGB UR QL STRIP.AUTO: 25
IMM GRANULOCYTES # BLD AUTO: 0.02 THOUSAND/UL (ref 0–0.2)
IMM GRANULOCYTES NFR BLD AUTO: 0 % (ref 0–2)
KETONES UR STRIP-MCNC: ABNORMAL MG/DL
LDLC SERPL CALC-MCNC: 99 MG/DL (ref 0–100)
LEUKOCYTE ESTERASE UR QL STRIP: 100
LYMPHOCYTES # BLD AUTO: 2.98 THOUSANDS/ÂΜL (ref 0.6–4.47)
LYMPHOCYTES NFR BLD AUTO: 47 % (ref 14–44)
MCH RBC QN AUTO: 30 PG (ref 26.8–34.3)
MCHC RBC AUTO-ENTMCNC: 34.5 G/DL (ref 31.4–37.4)
MCV RBC AUTO: 87 FL (ref 82–98)
MONOCYTES # BLD AUTO: 0.45 THOUSAND/ÂΜL (ref 0.17–1.22)
MONOCYTES NFR BLD AUTO: 7 % (ref 4–12)
NEUTROPHILS # BLD AUTO: 2.8 THOUSANDS/ÂΜL (ref 1.85–7.62)
NEUTS SEG NFR BLD AUTO: 45 % (ref 43–75)
NITRITE UR QL STRIP: NEGATIVE
NON-SQ EPI CELLS URNS QL MICRO: NORMAL /HPF
NONHDLC SERPL-MCNC: 117 MG/DL
NRBC BLD AUTO-RTO: 0 /100 WBCS
PH UR STRIP.AUTO: 5 [PH]
PLATELET # BLD AUTO: 197 THOUSANDS/UL (ref 149–390)
PMV BLD AUTO: 11.9 FL (ref 8.9–12.7)
POTASSIUM SERPL-SCNC: 3.7 MMOL/L (ref 3.5–5.3)
PROT SERPL-MCNC: 7.5 G/DL (ref 6.4–8.4)
PROT UR STRIP-MCNC: NEGATIVE MG/DL
RBC # BLD AUTO: 4.34 MILLION/UL (ref 3.81–5.12)
RBC #/AREA URNS AUTO: NORMAL /HPF
SODIUM SERPL-SCNC: 139 MMOL/L (ref 135–147)
SP GR UR STRIP.AUTO: 1.02 (ref 1–1.04)
TRIGL SERPL-MCNC: 90 MG/DL
UROBILINOGEN UA: NEGATIVE MG/DL
WBC # BLD AUTO: 6.31 THOUSAND/UL (ref 4.31–10.16)
WBC #/AREA URNS AUTO: NORMAL /HPF

## 2024-06-20 PROCEDURE — 99214 OFFICE O/P EST MOD 30 MIN: CPT | Performed by: PHYSICIAN ASSISTANT

## 2024-06-20 PROCEDURE — 80053 COMPREHEN METABOLIC PANEL: CPT

## 2024-06-20 PROCEDURE — 80061 LIPID PANEL: CPT

## 2024-06-20 PROCEDURE — 81003 URINALYSIS AUTO W/O SCOPE: CPT

## 2024-06-20 PROCEDURE — 82607 VITAMIN B-12: CPT

## 2024-06-20 PROCEDURE — 36415 COLL VENOUS BLD VENIPUNCTURE: CPT

## 2024-06-20 PROCEDURE — 85025 COMPLETE CBC W/AUTO DIFF WBC: CPT

## 2024-06-20 PROCEDURE — 81001 URINALYSIS AUTO W/SCOPE: CPT

## 2024-06-20 PROCEDURE — 82306 VITAMIN D 25 HYDROXY: CPT

## 2024-06-20 RX ORDER — HYDROXYZINE HYDROCHLORIDE 25 MG/1
25 TABLET, FILM COATED ORAL EVERY 6 HOURS PRN
Qty: 30 TABLET | Refills: 1 | Status: SHIPPED | OUTPATIENT
Start: 2024-06-20

## 2024-06-20 NOTE — PROGRESS NOTES
Assessment/Plan:    Acute nonintractable headache  - Headaches have decreased in frequency.  Patient has stopped taking Effexor.  - Continue taking Excedrin and drinking Gatorade as needed for acute headaches.  - Will continue to monitor.    Vitamin D deficiency  - Continue over-the-counter vitamin D daily supplement.  - Will check updated vitamin D level.    Vitamin B12 deficiency  - Continue over-the-counter vitamin B 12 daily supplement.  - Will check updated vitamin B 12  level.    Anxiety  - Will restart hydroxyzine 25 mg every 6 hours as needed for anxiety.  - Continue coping mechanisms.      Diagnoses and all orders for this visit:    Dysuria  -     Cancel: POCT urine dip auto non-scope  -     UA w Reflex to Microscopic w Reflex to Culture; Future    Healthcare maintenance  -     CBC and differential; Future  -     Comprehensive metabolic panel; Future  -     Lipid panel; Future    Vitamin B12 deficiency  -     Vitamin B12; Future    Vitamin D deficiency  -     Vitamin D 25 hydroxy; Future    Anxiety  -     hydrOXYzine HCL (ATARAX) 25 mg tablet; Take 1 tablet (25 mg total) by mouth every 6 (six) hours as needed for anxiety    Acute nonintractable headache, unspecified headache type          All of patients questions were answered. Patient understands and agrees with the above plan.     Return in about 4 weeks (around 7/18/2024) for Next scheduled follow up review labs.    Padmini Fisher PA-C  06/20/24  [facility]          Subjective:     Patient ID: Maureen Guerra  is a 54 y.o. female who presents today in office for back pain.     - Patient is a 54 y.o. female who presents today for upper back pain.    -Patient notes she did have surgery on her foot last year and she was out of work for about 3 months.  Patient notes while she was out of work she did not experience any headaches.  Patient notes now that she is back to work she is experiencing headaches again, about 2 times per month.  Patient notes  when she has a headache she will take Excedrin and drink some Gatorade and that usually helps.  Patient notes she is no longer taking Effexor.      -Patient notes she first experienced an episode of pain of her right shoulder blade in April.  Patient notes it lasted for about 2 days and then resolved.  Patient reports she then had another episode on May 8 and last week.  She denies any specific injury or trauma to the area.  Patient notes the pain is worse with movement.  Patient notes she did have some burning sensation with urination in early May for 1 day, but then she drank a lot of water and cranberry juice and symptoms resolved.    -Patient notes she would like to try hydroxyzine again for her anxiety.  Patient notes she generally only sleeps about 4-5 hours a night because that is all the time she has.  Patient notes she generally still feels about well rested with this amount of sleep.  Patient notes she continues to take daily vitamin D and vitamin B12 vitamins.    The following portions of the patient's history were reviewed and updated as appropriate: allergies, current medications, past family history, past medical history, past social history, past surgical history, and problem list.        Review of Systems   Constitutional:  Negative for chills and fever.   HENT:  Negative for congestion and sore throat.    Eyes:  Negative for pain.   Respiratory:  Negative for cough and shortness of breath.    Cardiovascular:  Negative for chest pain and palpitations.   Gastrointestinal:  Negative for abdominal pain, constipation, diarrhea, nausea and vomiting.   Genitourinary:  Negative for difficulty urinating and dysuria.   Musculoskeletal:  Positive for arthralgias and back pain. Negative for myalgias.   Skin:  Negative for rash.   Neurological:  Positive for headaches (2x/month). Negative for dizziness, weakness, light-headedness and numbness.   Psychiatric/Behavioral:  Positive for sleep disturbance. Negative  "for dysphoric mood, self-injury and suicidal ideas. The patient is nervous/anxious.           BMI Counseling: Body mass index is 30.73 kg/m². The BMI is above normal. Nutrition recommendations include decreasing portion sizes, encouraging healthy choices of fruits and vegetables, consuming healthier snacks, limiting drinks that contain sugar, moderation in carbohydrate intake, increasing intake of lean protein and reducing intake of cholesterol. Exercise recommendations include moderate physical activity 150 minutes/week. No pharmacotherapy was ordered. Rationale for BMI follow-up plan is due to patient being overweight or obese.     Depression Screening and Follow-up Plan: Patient was screened for depression during today's encounter. They screened negative with a PHQ-2 score of 0.          Objective:   Vitals:    06/20/24 1604   BP: 107/65   BP Location: Right arm   Patient Position: Sitting   Cuff Size: Standard   Pulse: 66   Resp: 18   Temp: 97.6 °F (36.4 °C)   TempSrc: Temporal   SpO2: 98%   Weight: 76.2 kg (168 lb)   Height: 5' 2\" (1.575 m)         Physical Exam  Vitals and nursing note reviewed.   Constitutional:       General: She is not in acute distress.     Appearance: She is well-developed.   HENT:      Head: Normocephalic and atraumatic.      Right Ear: External ear normal.      Left Ear: External ear normal.      Nose: Nose normal.   Eyes:      Conjunctiva/sclera: Conjunctivae normal.   Cardiovascular:      Rate and Rhythm: Normal rate and regular rhythm.      Pulses: Normal pulses.      Heart sounds: Normal heart sounds.   Pulmonary:      Effort: Pulmonary effort is normal. No respiratory distress.      Breath sounds: Normal breath sounds. No wheezing.   Musculoskeletal:      Cervical back: Normal range of motion and neck supple.        Back:    Skin:     General: Skin is warm and dry.   Neurological:      Mental Status: She is alert and oriented to person, place, and time.   Psychiatric:         " Behavior: Behavior normal.           PHQ-2/9 Depression Screening    Little interest or pleasure in doing things: 0 - not at all  Feeling down, depressed, or hopeless: 0 - not at all  PHQ-2 Score: 0  PHQ-2 Interpretation: Negative depression screen

## 2024-06-21 LAB
25(OH)D3 SERPL-MCNC: 33 NG/ML (ref 30–100)
VIT B12 SERPL-MCNC: 323 PG/ML (ref 180–914)

## 2024-06-21 NOTE — ASSESSMENT & PLAN NOTE
- Headaches have decreased in frequency.  Patient has stopped taking Effexor.  - Continue taking Excedrin and drinking Gatorade as needed for acute headaches.  - Will continue to monitor.

## 2024-06-21 NOTE — ASSESSMENT & PLAN NOTE
- Will restart hydroxyzine 25 mg every 6 hours as needed for anxiety.  - Continue coping mechanisms.

## 2024-06-21 NOTE — ASSESSMENT & PLAN NOTE
- Continue over-the-counter vitamin B 12 daily supplement.  - Will check updated vitamin B 12  level.

## 2024-09-01 NOTE — PROGRESS NOTES
"Daily Note     Today's date: 3/19/2024  Patient name: Maureen Guerra  : 1970  MRN: [id number]  Referring provider: Regan Parks PT  Dx:   Encounter Diagnosis     ICD-10-CM    1. Right foot pain  M79.671                      Subjective: Pt has met all goals and will d/c to [facility]P.    Objective: See treatment diary below    Assessment: Tolerated treatment well. Patient demonstrated fatigue post treatment and exhibited good technique with therapeutic exercises 1:1 with Regan Parks DPT for entirety of tx. D/C to [facility]P, all goals met.     Plan: D/C to [facility].      Precautions: universal    POC expires Unit limit Auth Expiration date PT/OT + Visit Limit?   24 na na na         Visit/Unit Tracking  AUTH Status:    BOMN         Pertinent Findings:      POC End Date: 24                                                                                          Test / Measure  2/5 3/19   FOTO (Predicted 70) 51 89   DF ROM  5 WNL   First ray ext 30 WNL       Visit 6 7 8 9 10   Manuals 2/27 3/1 3/4 3/15 3/19   First MTP mobs KS KS KS KS    First ray ext S 6x30\" 6x30\" 6x30\" 6x30\" 6x30\"                   Neuro Re-Ed        Toga        Nose leans        HR 2x20 2x20 2x20 2x20    TR 2x20 2x20 2x20 2x20    Foam SLS        TB EV x20mtb x20mtb x20mtb x20mtb    TB IV x20mtb x20mtb x20mtb x20mtb    TB DF x20mtb x20mtb x20mtb x20mtb            Ther Ex        HEP Review        Nustep 8'  8' RB  10'   prostretch 3x30\" 3x30\" 3x30\" 3x30\" 3x30\"                                           Ther Activity                        Gait Training                        Modalities                                             "
No

## 2024-09-07 ENCOUNTER — APPOINTMENT (OUTPATIENT)
Dept: LAB | Facility: HOSPITAL | Age: 54
End: 2024-09-07

## 2024-09-07 DIAGNOSIS — Z00.8 HEALTH EXAMINATION IN POPULATION SURVEY: ICD-10-CM

## 2024-09-07 LAB
CHOLEST SERPL-MCNC: 183 MG/DL
EST. AVERAGE GLUCOSE BLD GHB EST-MCNC: 114 MG/DL
HBA1C MFR BLD: 5.6 %
HDLC SERPL-MCNC: 51 MG/DL
LDLC SERPL CALC-MCNC: 90 MG/DL (ref 0–100)
NONHDLC SERPL-MCNC: 132 MG/DL
TRIGL SERPL-MCNC: 212 MG/DL

## 2024-09-07 PROCEDURE — 80061 LIPID PANEL: CPT

## 2024-09-07 PROCEDURE — 36415 COLL VENOUS BLD VENIPUNCTURE: CPT

## 2024-09-07 PROCEDURE — 83036 HEMOGLOBIN GLYCOSYLATED A1C: CPT

## 2025-01-20 ENCOUNTER — TELEPHONE (OUTPATIENT)
Dept: FAMILY MEDICINE CLINIC | Facility: CLINIC | Age: 55
End: 2025-01-20

## 2025-02-18 ENCOUNTER — TELEPHONE (OUTPATIENT)
Dept: FAMILY MEDICINE CLINIC | Facility: CLINIC | Age: 55
End: 2025-02-18

## 2025-02-18 NOTE — TELEPHONE ENCOUNTER
Patient left a message requesting to r/s missed appt. Left message to give us a call back to reschedule.

## 2025-02-21 ENCOUNTER — APPOINTMENT (OUTPATIENT)
Dept: LAB | Facility: CLINIC | Age: 55
End: 2025-02-21
Payer: COMMERCIAL

## 2025-02-21 ENCOUNTER — OFFICE VISIT (OUTPATIENT)
Dept: FAMILY MEDICINE CLINIC | Facility: CLINIC | Age: 55
End: 2025-02-21

## 2025-02-21 VITALS
TEMPERATURE: 97.1 F | RESPIRATION RATE: 14 BRPM | OXYGEN SATURATION: 99 % | DIASTOLIC BLOOD PRESSURE: 70 MMHG | HEIGHT: 62 IN | BODY MASS INDEX: 29.26 KG/M2 | HEART RATE: 65 BPM | SYSTOLIC BLOOD PRESSURE: 100 MMHG | WEIGHT: 159 LBS

## 2025-02-21 DIAGNOSIS — Z13.228 SCREENING FOR METABOLIC DISORDER: ICD-10-CM

## 2025-02-21 DIAGNOSIS — Z13.220 SCREENING FOR LIPID DISORDERS: ICD-10-CM

## 2025-02-21 DIAGNOSIS — E53.8 VITAMIN B12 DEFICIENCY: Chronic | ICD-10-CM

## 2025-02-21 DIAGNOSIS — Z12.31 ENCOUNTER FOR SCREENING MAMMOGRAM FOR BREAST CANCER: ICD-10-CM

## 2025-02-21 DIAGNOSIS — E55.9 VITAMIN D DEFICIENCY: Chronic | ICD-10-CM

## 2025-02-21 DIAGNOSIS — Z12.11 COLON CANCER SCREENING: ICD-10-CM

## 2025-02-21 DIAGNOSIS — F41.9 ANXIETY: ICD-10-CM

## 2025-02-21 DIAGNOSIS — Z00.00 ANNUAL PHYSICAL EXAM: Primary | ICD-10-CM

## 2025-02-21 DIAGNOSIS — R51.9 ACUTE NONINTRACTABLE HEADACHE, UNSPECIFIED HEADACHE TYPE: Chronic | ICD-10-CM

## 2025-02-21 LAB
25(OH)D3 SERPL-MCNC: 30.4 NG/ML (ref 30–100)
ALBUMIN SERPL BCG-MCNC: 4.2 G/DL (ref 3.5–5)
ALP SERPL-CCNC: 64 U/L (ref 34–104)
ALT SERPL W P-5'-P-CCNC: 23 U/L (ref 7–52)
ANION GAP SERPL CALCULATED.3IONS-SCNC: 9 MMOL/L (ref 4–13)
AST SERPL W P-5'-P-CCNC: 24 U/L (ref 13–39)
BASOPHILS # BLD AUTO: 0.03 THOUSANDS/ΜL (ref 0–0.1)
BASOPHILS NFR BLD AUTO: 1 % (ref 0–1)
BILIRUB SERPL-MCNC: 0.57 MG/DL (ref 0.2–1)
BUN SERPL-MCNC: 21 MG/DL (ref 5–25)
CALCIUM SERPL-MCNC: 9.8 MG/DL (ref 8.4–10.2)
CHLORIDE SERPL-SCNC: 102 MMOL/L (ref 96–108)
CHOLEST SERPL-MCNC: 193 MG/DL (ref ?–200)
CO2 SERPL-SCNC: 29 MMOL/L (ref 21–32)
CREAT SERPL-MCNC: 0.96 MG/DL (ref 0.6–1.3)
EOSINOPHIL # BLD AUTO: 0.17 THOUSAND/ΜL (ref 0–0.61)
EOSINOPHIL NFR BLD AUTO: 3 % (ref 0–6)
ERYTHROCYTE [DISTWIDTH] IN BLOOD BY AUTOMATED COUNT: 13.2 % (ref 11.6–15.1)
GFR SERPL CREATININE-BSD FRML MDRD: 66 ML/MIN/1.73SQ M
GLUCOSE P FAST SERPL-MCNC: 98 MG/DL (ref 65–99)
HCT VFR BLD AUTO: 42.1 % (ref 34.8–46.1)
HDLC SERPL-MCNC: 55 MG/DL
HGB BLD-MCNC: 13.4 G/DL (ref 11.5–15.4)
IMM GRANULOCYTES # BLD AUTO: 0.01 THOUSAND/UL (ref 0–0.2)
IMM GRANULOCYTES NFR BLD AUTO: 0 % (ref 0–2)
LDLC SERPL CALC-MCNC: 123 MG/DL (ref 0–100)
LYMPHOCYTES # BLD AUTO: 2.44 THOUSANDS/ΜL (ref 0.6–4.47)
LYMPHOCYTES NFR BLD AUTO: 45 % (ref 14–44)
MCH RBC QN AUTO: 28.8 PG (ref 26.8–34.3)
MCHC RBC AUTO-ENTMCNC: 31.8 G/DL (ref 31.4–37.4)
MCV RBC AUTO: 90 FL (ref 82–98)
MONOCYTES # BLD AUTO: 0.49 THOUSAND/ΜL (ref 0.17–1.22)
MONOCYTES NFR BLD AUTO: 9 % (ref 4–12)
NEUTROPHILS # BLD AUTO: 2.24 THOUSANDS/ΜL (ref 1.85–7.62)
NEUTS SEG NFR BLD AUTO: 42 % (ref 43–75)
NONHDLC SERPL-MCNC: 138 MG/DL
NRBC BLD AUTO-RTO: 0 /100 WBCS
PLATELET # BLD AUTO: 211 THOUSANDS/UL (ref 149–390)
PMV BLD AUTO: 11.9 FL (ref 8.9–12.7)
POTASSIUM SERPL-SCNC: 4.5 MMOL/L (ref 3.5–5.3)
PROT SERPL-MCNC: 7.5 G/DL (ref 6.4–8.4)
RBC # BLD AUTO: 4.66 MILLION/UL (ref 3.81–5.12)
SODIUM SERPL-SCNC: 140 MMOL/L (ref 135–147)
TRIGL SERPL-MCNC: 75 MG/DL (ref ?–150)
VIT B12 SERPL-MCNC: 398 PG/ML (ref 180–914)
WBC # BLD AUTO: 5.38 THOUSAND/UL (ref 4.31–10.16)

## 2025-02-21 PROCEDURE — 80061 LIPID PANEL: CPT

## 2025-02-21 PROCEDURE — 80053 COMPREHEN METABOLIC PANEL: CPT

## 2025-02-21 PROCEDURE — 82306 VITAMIN D 25 HYDROXY: CPT

## 2025-02-21 PROCEDURE — 99214 OFFICE O/P EST MOD 30 MIN: CPT | Performed by: PHYSICIAN ASSISTANT

## 2025-02-21 PROCEDURE — 36415 COLL VENOUS BLD VENIPUNCTURE: CPT

## 2025-02-21 PROCEDURE — 96372 THER/PROPH/DIAG INJ SC/IM: CPT | Performed by: PHYSICIAN ASSISTANT

## 2025-02-21 PROCEDURE — 85025 COMPLETE CBC W/AUTO DIFF WBC: CPT

## 2025-02-21 PROCEDURE — 82607 VITAMIN B-12: CPT

## 2025-02-21 PROCEDURE — 99396 PREV VISIT EST AGE 40-64: CPT | Performed by: PHYSICIAN ASSISTANT

## 2025-02-21 RX ORDER — KETOROLAC TROMETHAMINE 30 MG/ML
30 INJECTION, SOLUTION INTRAMUSCULAR; INTRAVENOUS ONCE
Status: COMPLETED | OUTPATIENT
Start: 2025-02-21 | End: 2025-02-21

## 2025-02-21 RX ORDER — HYDROXYZINE HYDROCHLORIDE 25 MG/1
25 TABLET, FILM COATED ORAL EVERY 6 HOURS PRN
Qty: 30 TABLET | Refills: 1 | Status: SHIPPED | OUTPATIENT
Start: 2025-02-21

## 2025-02-21 RX ADMIN — KETOROLAC TROMETHAMINE 30 MG: 30 INJECTION, SOLUTION INTRAMUSCULAR; INTRAVENOUS at 08:20

## 2025-02-21 NOTE — ASSESSMENT & PLAN NOTE
- Continue over-the-counter vitamin B 12 daily supplement.  - Will check updated vitamin B 12  level.  Orders:    Vitamin B12; Future

## 2025-02-21 NOTE — ASSESSMENT & PLAN NOTE
-Stable.  Continue hydroxyzine 25 mg, every 6 hours as needed for anxiety/sleep.  - Continue coping mechanisms.  Orders:    hydrOXYzine HCL (ATARAX) 25 mg tablet; Take 1 tablet (25 mg total) by mouth every 6 (six) hours as needed for anxiety

## 2025-02-21 NOTE — ASSESSMENT & PLAN NOTE
- Continue over-the-counter vitamin D daily supplement.  - Will check updated vitamin D level.  Orders:    Vitamin D 25 hydroxy; Future

## 2025-02-21 NOTE — PROGRESS NOTES
Adult Annual Physical  Name: Maureen Guerra      : 1970      MRN: [id number]  Encounter Provider: Padmini Fisher PA-C  Encounter Date: 2025   Encounter department: [facility]    Assessment & Plan  Annual physical exam         Anxiety  -Stable.  Continue hydroxyzine 25 mg, every 6 hours as needed for anxiety/sleep.  - Continue coping mechanisms.  Orders:    hydrOXYzine HCL (ATARAX) 25 mg tablet; Take 1 tablet (25 mg total) by mouth every 6 (six) hours as needed for anxiety    Colon cancer screening    Orders:    Cologuard    Encounter for screening mammogram for breast cancer    Orders:    Mammo screening bilateral w 3d and cad; Future    Vitamin D deficiency  - Continue over-the-counter vitamin D daily supplement.  - Will check updated vitamin D level.  Orders:    Vitamin D 25 hydroxy; Future    Vitamin B12 deficiency  - Continue over-the-counter vitamin B 12 daily supplement.  - Will check updated vitamin B 12  level.  Orders:    Vitamin B12; Future    Screening for lipid disorders    Orders:    Lipid panel; Future    Screening for metabolic disorder    Orders:    CBC and differential; Future    Comprehensive metabolic panel; Future    Acute nonintractable headache, unspecified headache type  -Occurring about 2-3 times a month, lasting 1-3 days at a time.  Currently with acute headache.  Toradol 30 mg IM administered today.  - Discussed importance of proper sleep, hydration, nutrition.  - Continue Excedrin as needed.  - Patient previously taking Effexor for preventative therapy, however patient does not wish to restart this.  Orders:    ketorolac (TORADOL) injection 30 mg    Immunizations and preventive care screenings were discussed with patient today. Appropriate education was printed on patient's after visit summary.    Counseling:  Alcohol/drug use: discussed moderation in alcohol intake, the recommendations for healthy alcohol use, and avoidance of illicit  drug use.  Dental Health: discussed importance of regular tooth brushing, flossing, and dental visits.  Injury prevention: discussed safety/seat belts, safety helmets, smoke detectors, carbon monoxide detectors, and smoking near bedding or upholstery.  Sexual health: discussed sexually transmitted diseases, partner selection, use of condoms, avoidance of unintended pregnancy, and contraceptive alternatives.  Exercise: the importance of regular exercise/physical activity was discussed. Recommend exercise 3-5 times per week for at least 30 minutes.     BMI Counseling: Body mass index is 29.08 kg/m². The BMI is above normal. Nutrition recommendations include decreasing portion sizes, encouraging healthy choices of fruits and vegetables, consuming healthier snacks, limiting drinks that contain sugar, moderation in carbohydrate intake, increasing intake of lean protein and reducing intake of cholesterol. Exercise recommendations include moderate physical activity 150 minutes/week. No pharmacotherapy was ordered. Rationale for BMI follow-up plan is due to patient being overweight or obese.         History of Present Illness     Adult Annual Physical:  Patient presents for annual physical.     Diet and Physical Activity:  - Diet/Nutrition: well balanced diet.  - Exercise: walking. active at work    Depression Screening:  - PHQ-2 Score: 0    General Health:  - Sleep: sleeps well and 4-6 hours of sleep on average. feels ok with sleeping 5-6 hrs a night; takes hydroxyzine PRN  - Hearing: normal hearing bilateral ears.  - Vision: vision problems, goes for regular eye exams and wears glasses.  - Dental: regular dental visits and brushes teeth twice daily.    /GYN Health:  - Follows with GYN: no.   - Menopause: postmenopausal.   - History of STDs: no    Advanced Care Planning:  - Has an advanced directive?: no    - Has a durable medical POA?: no    - ACP document given to patient?: yes      -Patient notes she continues to  experience a migraine about 2-3 times a month, usually last anywhere from 1-3 days at a time.  Patient notes she does currently have a headache today.  Patient notes she generally will take Excedrin and try to relax.  Patient notes she does experience associated nausea and is unable to eat much.  Patient notes recently she has been experiencing some pain of her neck and upper shoulder blade.    -Patient notes that she has a bad night with sleeping, such as sleeping only about 1-2 hours, then she will take the hydroxyzine next night so that she is guaranteed to sleep well.  Patient notes her right foot pain has been improving since her surgery.      Review of Systems   Constitutional:  Negative for chills and fever.   HENT:  Negative for congestion and sore throat.    Eyes:  Negative for pain.   Respiratory:  Negative for cough and shortness of breath.    Cardiovascular:  Negative for chest pain and palpitations.   Gastrointestinal:  Negative for abdominal pain, constipation, diarrhea, nausea and vomiting.   Genitourinary:  Negative for difficulty urinating and dysuria.   Musculoskeletal:  Positive for arthralgias and back pain. Negative for myalgias.   Skin:  Negative for rash.   Neurological:  Positive for headaches (2x/month). Negative for dizziness, weakness, light-headedness and numbness.   Psychiatric/Behavioral:  Positive for sleep disturbance. Negative for dysphoric mood, self-injury and suicidal ideas. The patient is nervous/anxious.      Pertinent Medical History       Medical History Reviewed by provider this encounter:  Tobacco  Allergies  Meds  Problems  Med Hx  Surg Hx  Fam Hx     .  Past Medical History   Past Medical History:   Diagnosis Date    Anxiety states 2012    Migraines     Wears partial dentures      Past Surgical History:   Procedure Laterality Date     SECTION      x2    HI CORRJ HLX VLGS BNCTY SESMDC JOINT ARTHRODESIS Right 11/10/2023    Procedure: BUNIONECTOMY  "BABAKTERESAANGELICAL #2, HAMMERTOE REPAIR;  Surgeon: Viral Cai DPM;  Location:  MAIN OR;  Service: Podiatry    TUBAL LIGATION       Family History   Problem Relation Age of Onset    Osteoporosis Mother     Leukemia Brother     Diabetes Brother       reports that she has never smoked. She has never used smokeless tobacco. She reports that she does not currently use alcohol. She reports that she does not use drugs.  Current Outpatient Medications   Medication Instructions    Cyanocobalamin (VITAMIN B 12 PO) Take by mouth    hydrOXYzine HCL (ATARAX) 25 mg, Oral, Every 6 hours PRN    VITAMIN D PO Take by mouth   No Known Allergies   Current Outpatient Medications on File Prior to Visit   Medication Sig Dispense Refill    Cyanocobalamin (VITAMIN B 12 PO) Take by mouth      VITAMIN D PO Take by mouth       No current facility-administered medications on file prior to visit.      Social History     Tobacco Use    Smoking status: Never    Smokeless tobacco: Never   Vaping Use    Vaping status: Never Used   Substance and Sexual Activity    Alcohol use: Not Currently     Comment: Once a month    Drug use: Never    Sexual activity: Not Currently     Birth control/protection: Post-menopausal       Objective   /70 (BP Location: Right arm, Patient Position: Sitting, Cuff Size: Standard)   Pulse 65   Temp (!) 97.1 °F (36.2 °C) (Temporal)   Resp 14   Ht 5' 2\" (1.575 m)   Wt 72.1 kg (159 lb)   LMP  (LMP Unknown)   SpO2 99%   BMI 29.08 kg/m²     Physical Exam  Vitals and nursing note reviewed.   Constitutional:       General: She is not in acute distress.     Appearance: She is well-developed.   HENT:      Head: Normocephalic and atraumatic.      Right Ear: Tympanic membrane and external ear normal.      Left Ear: Tympanic membrane and external ear normal.      Nose: Nose normal.      Mouth/Throat:      Pharynx: Uvula midline. No posterior oropharyngeal erythema.   Eyes:      Extraocular Movements: Extraocular " movements intact.      Conjunctiva/sclera: Conjunctivae normal.      Pupils: Pupils are equal, round, and reactive to light.   Cardiovascular:      Rate and Rhythm: Normal rate and regular rhythm.      Pulses: Normal pulses.      Heart sounds: Normal heart sounds. No murmur heard.  Pulmonary:      Effort: Pulmonary effort is normal. No respiratory distress.      Breath sounds: Normal breath sounds. No wheezing.   Abdominal:      General: Bowel sounds are normal.      Tenderness: There is no abdominal tenderness.   Musculoskeletal:      Cervical back: Normal range of motion and neck supple.   Skin:     General: Skin is warm.   Neurological:      Mental Status: She is alert and oriented to person, place, and time.   Psychiatric:         Speech: Speech normal.         Behavior: Behavior normal.

## 2025-02-21 NOTE — ASSESSMENT & PLAN NOTE
-Occurring about 2-3 times a month, lasting 1-3 days at a time.  Currently with acute headache.  Toradol 30 mg IM administered today.  - Discussed importance of proper sleep, hydration, nutrition.  - Continue Excedrin as needed.  - Patient previously taking Effexor for preventative therapy, however patient does not wish to restart this.  Orders:    ketorolac (TORADOL) injection 30 mg

## 2025-02-21 NOTE — PATIENT INSTRUCTIONS
Please call central scheduling at [phone] to schedule mammogram. Thanks!       Grief and Loss Resources:    [employer] is an online resource dedicated to helping individuals connect with grief recovery support groups. Latest information on Online and/or in person groups here in [place] can be found on their website. [email or web address].griefshare.org/    [facility]   [address] , [place] [address]   Contact [phone]    [facility]  [address] [place] [address]  Contact [phone]    [facility]  [address] [place], [place]  Contact [phone]    [facility]  [address] [facility], [place]  Contact [phone]    [facility]  [address], [place]  Contact [phone]    [facility]  Eligibility: We serve members and families of the LGBTQ+ community who are experiencing loss and grieving.    This group meets virtually (via Zoom) on the 4th Tuesday of each month from 5:00 to 6:30 p.m. Eastern. Live closed captioning is available. For Zoom access, email [email or web address]    [facility])  Suicide prevention and support for those affected by suicide.    First Tuesday of month 7:00 - 8:00PM [facility] Main Entrance: [address] [place]    Third Thursday of month 7:00 - 8:30PM [facility]: [address] [facility]    Survivors of Suicide Loss Support Group  Third Thursday of the month 7:00 to 8:30PM [address]. Contact [email or web address]    [email or web address] is a resource to find psychotherapy providers for individual services, group therapy such as grief.    Patients can filter therapist search list based on several criteria including language, specialty, gender, insurance, etc. Individuals seeking will need to reach out to  perspective providers through information in the directory. You are encouraged to contact multiple providers to given that many providers have a significant wait list for services as well as to find a provider is a good fit for you!

## 2025-02-24 ENCOUNTER — RESULTS FOLLOW-UP (OUTPATIENT)
Dept: FAMILY MEDICINE CLINIC | Facility: CLINIC | Age: 55
End: 2025-02-24

## 2025-03-03 LAB — COLOGUARD RESULT REPORTABLE: NEGATIVE

## (undated) DEVICE — ACE WRAP 4 IN UNSTERILE

## (undated) DEVICE — GAUZE SPONGES,16 PLY: Brand: CURITY

## (undated) DEVICE — BETHLEHEM UNIVERSAL  MIONR EXT: Brand: CARDINAL HEALTH

## (undated) DEVICE — DISPOSABLE OR TOWEL: Brand: CARDINAL HEALTH

## (undated) DEVICE — PENCIL ELECTROSURG E-Z CLEAN -0035H

## (undated) DEVICE — DRAPE C-ARM X-RAY

## (undated) DEVICE — GUIDEWIRE ORTHO STAINLESS STEEL 1.4MM THREAD GOLD

## (undated) DEVICE — TUBE SURGICAL IRRIGATION

## (undated) DEVICE — K-WIRE COVERS, STERILE, WHITE, .043-INCH, 1.1MM, 2 PER PACKAGE: Brand: KEY SURGICAL K-WIRE AND PIN COVERS

## (undated) DEVICE — Device

## (undated) DEVICE — CAST PADDING 4 IN SYNTHETIC NON-STRL

## (undated) DEVICE — POSITIONING PIN
Type: IMPLANTABLE DEVICE | Site: TOE | Status: NON-FUNCTIONAL
Removed: 2023-11-10

## (undated) DEVICE — INTENDED FOR TISSUE SEPARATION, AND OTHER PROCEDURES THAT REQUIRE A SHARP SURGICAL BLADE TO PUNCTURE OR CUT.: Brand: BARD-PARKER ® SAFETYLOCK CARBON RIB-BACK BLADES

## (undated) DEVICE — SCULPTING BURR: Brand: PROSTEP MIS ARTHRODESIS

## (undated) DEVICE — SINGLE PORT MANIFOLD: Brand: NEPTUNE 2

## (undated) DEVICE — DRILL BIT

## (undated) DEVICE — STOCKINETTE 2P PREROLLD 6X60

## (undated) DEVICE — STERILE POLYISOPRENE POWDER-FREE SURGICAL GLOVES WITH EMOLLIENT COATING: Brand: PROTEXIS

## (undated) DEVICE — STANDARD DRILL BIT , AO

## (undated) DEVICE — K-WIRE
Type: IMPLANTABLE DEVICE | Site: TOE | Status: NON-FUNCTIONAL
Brand: ASNIS
Removed: 2023-11-10

## (undated) DEVICE — K-WIRE
Type: IMPLANTABLE DEVICE | Site: TOE | Status: NON-FUNCTIONAL
Removed: 2023-11-10

## (undated) DEVICE — DRILL BIT CANN 2.7MM

## (undated) DEVICE — COUNTERSINK 4MM

## (undated) DEVICE — DRILL CANNULATED SONICPIN: Brand: SONICFUSION

## (undated) DEVICE — CUFF TOURNIQUET 18 X 4 IN QUICK CONNECT DISP 1 BLADDER

## (undated) DEVICE — STRETCH BANDAGE: Brand: CURITY

## (undated) DEVICE — THIN OFFSET (9.0 X 0.38 X 25.0MM)

## (undated) DEVICE — JOINT PREP INSTRUMENT KIT: Brand: ORTHOLOC™ 2

## (undated) DEVICE — CHLORAPREP HI-LITE 26ML ORANGE

## (undated) DEVICE — NEEDLE 25G X 1 1/2

## (undated) DEVICE — SYRINGE 10ML LL

## (undated) DEVICE — OCCLUSIVE GAUZE STRIP,3% BISMUTH TRIBROMOPHENATE IN PETROLATUM BLEND: Brand: XEROFORM

## (undated) DEVICE — STERILE POLYISOPRENE POWDER-FREE SURGICAL GLOVES: Brand: PROTEXIS

## (undated) DEVICE — NEEDLE BLUNT 18 G X 1 1/2IN

## (undated) DEVICE — SCD SEQUENTIAL COMPRESSION COMFORT SLEEVE MEDIUM KNEE LENGTH: Brand: KENDALL SCD

## (undated) DEVICE — COBAN 4 IN STERILE